# Patient Record
Sex: MALE | Race: WHITE | Employment: OTHER | URBAN - NONMETROPOLITAN AREA
[De-identification: names, ages, dates, MRNs, and addresses within clinical notes are randomized per-mention and may not be internally consistent; named-entity substitution may affect disease eponyms.]

---

## 2017-01-27 LAB — PROSTATE SPECIFIC ANTIGEN: 8.65 NG/ML

## 2017-04-10 ENCOUNTER — OFFICE VISIT (OUTPATIENT)
Dept: UROLOGY | Age: 82
End: 2017-04-10

## 2017-04-10 VITALS
DIASTOLIC BLOOD PRESSURE: 88 MMHG | BODY MASS INDEX: 39.83 KG/M2 | SYSTOLIC BLOOD PRESSURE: 150 MMHG | HEIGHT: 68 IN | WEIGHT: 262.8 LBS

## 2017-04-10 DIAGNOSIS — R97.20 ELEVATED PSA: Primary | ICD-10-CM

## 2017-04-10 DIAGNOSIS — R35.1 NOCTURIA: ICD-10-CM

## 2017-04-10 LAB
BILIRUBIN, POC: NORMAL
BLOOD URINE, POC: NORMAL
CLARITY, POC: CLEAR
COLOR, POC: YELLOW
GLUCOSE URINE, POC: NORMAL
KETONES, POC: NORMAL
LEUKOCYTE EST, POC: NORMAL
NITRITE, POC: NORMAL
PH, POC: 6
PROTEIN, POC: NORMAL
SPECIFIC GRAVITY, POC: 1.02
UROBILINOGEN, POC: NORMAL

## 2017-04-10 PROCEDURE — 4040F PNEUMOC VAC/ADMIN/RCVD: CPT | Performed by: UROLOGY

## 2017-04-10 PROCEDURE — G8427 DOCREV CUR MEDS BY ELIG CLIN: HCPCS | Performed by: UROLOGY

## 2017-04-10 PROCEDURE — 1123F ACP DISCUSS/DSCN MKR DOCD: CPT | Performed by: UROLOGY

## 2017-04-10 PROCEDURE — 81003 URINALYSIS AUTO W/O SCOPE: CPT | Performed by: UROLOGY

## 2017-04-10 PROCEDURE — 1036F TOBACCO NON-USER: CPT | Performed by: UROLOGY

## 2017-04-10 PROCEDURE — G8417 CALC BMI ABV UP PARAM F/U: HCPCS | Performed by: UROLOGY

## 2017-04-10 PROCEDURE — G8598 ASA/ANTIPLAT THER USED: HCPCS | Performed by: UROLOGY

## 2017-04-10 PROCEDURE — 99203 OFFICE O/P NEW LOW 30 MIN: CPT | Performed by: UROLOGY

## 2017-04-10 ASSESSMENT — ENCOUNTER SYMPTOMS
EYE PAIN: 0
COLOR CHANGE: 0
BACK PAIN: 0
FACIAL SWELLING: 0
CHEST TIGHTNESS: 0
NAUSEA: 0
ABDOMINAL PAIN: 0
EYE REDNESS: 0
SHORTNESS OF BREATH: 0

## 2017-09-12 ENCOUNTER — TELEPHONE (OUTPATIENT)
Dept: UROLOGY | Age: 82
End: 2017-09-12

## 2017-12-04 LAB
PROSTATE SPECIFIC ANTIGEN FREE: 0.8 NG/ML
PROSTATE SPECIFIC ANTIGEN PERCENT FREE: 9 %
PSA-PROSTATE SPECIFIC AG: 8.6

## 2017-12-11 ENCOUNTER — OFFICE VISIT (OUTPATIENT)
Dept: UROLOGY | Age: 82
End: 2017-12-11
Payer: MEDICARE

## 2017-12-11 VITALS
BODY MASS INDEX: 40.74 KG/M2 | HEIGHT: 68 IN | DIASTOLIC BLOOD PRESSURE: 70 MMHG | WEIGHT: 268.8 LBS | SYSTOLIC BLOOD PRESSURE: 110 MMHG

## 2017-12-11 DIAGNOSIS — R97.20 ELEVATED PSA: Primary | ICD-10-CM

## 2017-12-11 DIAGNOSIS — R35.1 NOCTURIA: ICD-10-CM

## 2017-12-11 LAB
BILIRUBIN, POC: NORMAL
BLOOD URINE, POC: NORMAL
CLARITY, POC: CLEAR
COLOR, POC: YELLOW
GLUCOSE URINE, POC: NORMAL
KETONES, POC: NORMAL
LEUKOCYTE EST, POC: NORMAL
NITRITE, POC: NORMAL
PH, POC: 7
POST VOID RESIDUAL (PVR): 6 ML
PROTEIN, POC: NORMAL
SPECIFIC GRAVITY, POC: 1.02
UROBILINOGEN, POC: NORMAL

## 2017-12-11 PROCEDURE — 51798 US URINE CAPACITY MEASURE: CPT | Performed by: UROLOGY

## 2017-12-11 PROCEDURE — G8427 DOCREV CUR MEDS BY ELIG CLIN: HCPCS | Performed by: UROLOGY

## 2017-12-11 PROCEDURE — G8417 CALC BMI ABV UP PARAM F/U: HCPCS | Performed by: UROLOGY

## 2017-12-11 PROCEDURE — 1036F TOBACCO NON-USER: CPT | Performed by: UROLOGY

## 2017-12-11 PROCEDURE — G8484 FLU IMMUNIZE NO ADMIN: HCPCS | Performed by: UROLOGY

## 2017-12-11 PROCEDURE — 99213 OFFICE O/P EST LOW 20 MIN: CPT | Performed by: UROLOGY

## 2017-12-11 PROCEDURE — 1123F ACP DISCUSS/DSCN MKR DOCD: CPT | Performed by: UROLOGY

## 2017-12-11 PROCEDURE — G8598 ASA/ANTIPLAT THER USED: HCPCS | Performed by: UROLOGY

## 2017-12-11 PROCEDURE — 4040F PNEUMOC VAC/ADMIN/RCVD: CPT | Performed by: UROLOGY

## 2017-12-11 PROCEDURE — 81003 URINALYSIS AUTO W/O SCOPE: CPT | Performed by: UROLOGY

## 2017-12-11 NOTE — PROGRESS NOTES
wishes to follow his PSA. He was explained about chances of missing significant prostate cancer. Return in about 1 year (around 12/11/2018), or if symptoms worsen or fail to improve, for Elevated PSA. Medication Ordered:  No orders of the defined types were placed in this encounter.     Orders Placed:  Orders Placed This Encounter   Procedures    PSA Prostatic Specific Antigen     Standing Status:   Future     Standing Expiration Date:   12/11/2018    POCT Urinalysis No Micro (Auto)    poct post void residual     Bladder scan       Electronically signed by Sharon Camarena MD on 12/11/2017 at 9:31 AM

## 2018-12-11 DIAGNOSIS — R97.20 ELEVATED PSA: Primary | ICD-10-CM

## 2018-12-11 LAB
INR BLD: 3.8
PROSTATE SPECIFIC ANTIGEN: 12.21 NG/ML
PROTIME: NORMAL SECONDS

## 2018-12-12 ENCOUNTER — TELEPHONE (OUTPATIENT)
Dept: UROLOGY | Age: 83
End: 2018-12-12

## 2019-01-17 ENCOUNTER — OFFICE VISIT (OUTPATIENT)
Dept: UROLOGY | Age: 84
End: 2019-01-17
Payer: MEDICARE

## 2019-01-17 VITALS
BODY MASS INDEX: 39.25 KG/M2 | SYSTOLIC BLOOD PRESSURE: 130 MMHG | HEIGHT: 68 IN | DIASTOLIC BLOOD PRESSURE: 68 MMHG | WEIGHT: 259 LBS

## 2019-01-17 DIAGNOSIS — R97.20 ELEVATED PSA: Primary | ICD-10-CM

## 2019-01-17 LAB
BILIRUBIN, POC: NORMAL
BLOOD URINE, POC: NORMAL
CLARITY, POC: CLEAR
COLOR, POC: YELLOW
GLUCOSE URINE, POC: NORMAL
KETONES, POC: NORMAL
LEUKOCYTE EST, POC: NORMAL
NITRITE, POC: NORMAL
PH, POC: 5.5
PROTEIN, POC: NORMAL
SPECIFIC GRAVITY, POC: 1.02
UROBILINOGEN, POC: 0.2

## 2019-01-17 PROCEDURE — 81003 URINALYSIS AUTO W/O SCOPE: CPT | Performed by: NURSE PRACTITIONER

## 2019-01-17 PROCEDURE — G8417 CALC BMI ABV UP PARAM F/U: HCPCS | Performed by: NURSE PRACTITIONER

## 2019-01-17 PROCEDURE — 4040F PNEUMOC VAC/ADMIN/RCVD: CPT | Performed by: NURSE PRACTITIONER

## 2019-01-17 PROCEDURE — G8427 DOCREV CUR MEDS BY ELIG CLIN: HCPCS | Performed by: NURSE PRACTITIONER

## 2019-01-17 PROCEDURE — G8484 FLU IMMUNIZE NO ADMIN: HCPCS | Performed by: NURSE PRACTITIONER

## 2019-01-17 PROCEDURE — 99214 OFFICE O/P EST MOD 30 MIN: CPT | Performed by: NURSE PRACTITIONER

## 2019-01-17 PROCEDURE — 1123F ACP DISCUSS/DSCN MKR DOCD: CPT | Performed by: NURSE PRACTITIONER

## 2019-01-17 PROCEDURE — 1101F PT FALLS ASSESS-DOCD LE1/YR: CPT | Performed by: NURSE PRACTITIONER

## 2019-01-17 PROCEDURE — 1036F TOBACCO NON-USER: CPT | Performed by: NURSE PRACTITIONER

## 2019-01-28 ENCOUNTER — HOSPITAL ENCOUNTER (OUTPATIENT)
Dept: MRI IMAGING | Age: 84
Discharge: HOME | End: 2019-01-28
Payer: MEDICARE

## 2019-01-28 DIAGNOSIS — R97.20 ELEVATED PSA: ICD-10-CM

## 2019-01-28 LAB — POC CREATININE WHOLE BLOOD: 1 MG/DL (ref 0.5–1.2)

## 2019-01-28 PROCEDURE — A9579 GAD-BASE MR CONTRAST NOS,1ML: HCPCS | Performed by: NURSE PRACTITIONER

## 2019-01-28 PROCEDURE — 76377 3D RENDER W/INTRP POSTPROCES: CPT

## 2019-01-28 PROCEDURE — 6360000004 HC RX CONTRAST MEDICATION: Performed by: NURSE PRACTITIONER

## 2019-01-28 PROCEDURE — 82565 ASSAY OF CREATININE: CPT

## 2019-01-28 RX ADMIN — GADOTERIDOL 20 ML: 279.3 INJECTION, SOLUTION INTRAVENOUS at 14:45

## 2019-01-29 ENCOUNTER — TELEPHONE (OUTPATIENT)
Dept: UROLOGY | Age: 84
End: 2019-01-29

## 2019-02-04 ENCOUNTER — TELEPHONE (OUTPATIENT)
Dept: UROLOGY | Age: 84
End: 2019-02-04

## 2019-02-05 RX ORDER — CIPROFLOXACIN 500 MG/1
500 TABLET, FILM COATED ORAL 2 TIMES DAILY
Qty: 6 TABLET | Refills: 0 | Status: SHIPPED | OUTPATIENT
Start: 2019-02-05 | End: 2019-02-08

## 2019-02-05 RX ORDER — GENTAMICIN SULFATE 40 MG/ML
80 INJECTION, SOLUTION INTRAMUSCULAR; INTRAVENOUS ONCE
Qty: 2 ML | Refills: 0 | Status: SHIPPED | OUTPATIENT
Start: 2019-02-05 | End: 2019-02-05

## 2019-02-18 ENCOUNTER — PROCEDURE VISIT (OUTPATIENT)
Dept: UROLOGY | Age: 84
End: 2019-02-18
Payer: MEDICARE

## 2019-02-18 VITALS
DIASTOLIC BLOOD PRESSURE: 60 MMHG | BODY MASS INDEX: 38.52 KG/M2 | HEIGHT: 69 IN | SYSTOLIC BLOOD PRESSURE: 112 MMHG | WEIGHT: 260.1 LBS

## 2019-02-18 DIAGNOSIS — R97.20 ELEVATED PSA: Primary | ICD-10-CM

## 2019-02-18 PROCEDURE — 99999 PR SONO GUIDE NEEDLE BIOPSY: CPT | Performed by: UROLOGY

## 2019-02-18 PROCEDURE — 96372 THER/PROPH/DIAG INJ SC/IM: CPT | Performed by: UROLOGY

## 2019-02-18 PROCEDURE — 76872 US TRANSRECTAL: CPT | Performed by: UROLOGY

## 2019-02-18 PROCEDURE — 99999 PR OFFICE/OUTPT VISIT,PROCEDURE ONLY: CPT | Performed by: UROLOGY

## 2019-02-18 PROCEDURE — 55700 PR BIOPSY OF PROSTATE,NEEDLE/PUNCH: CPT | Performed by: UROLOGY

## 2019-02-18 RX ORDER — GENTAMICIN SULFATE 40 MG/ML
80 INJECTION, SOLUTION INTRAMUSCULAR; INTRAVENOUS ONCE
Status: COMPLETED | OUTPATIENT
Start: 2019-02-18 | End: 2019-02-18

## 2019-02-18 RX ADMIN — GENTAMICIN SULFATE 80 MG: 40 INJECTION, SOLUTION INTRAMUSCULAR; INTRAVENOUS at 12:54

## 2019-03-05 ENCOUNTER — TELEPHONE (OUTPATIENT)
Dept: UROLOGY | Age: 84
End: 2019-03-05

## 2019-03-06 ENCOUNTER — OFFICE VISIT (OUTPATIENT)
Dept: UROLOGY | Age: 84
End: 2019-03-06
Payer: MEDICARE

## 2019-03-06 VITALS
WEIGHT: 258 LBS | BODY MASS INDEX: 38.21 KG/M2 | DIASTOLIC BLOOD PRESSURE: 60 MMHG | HEIGHT: 69 IN | SYSTOLIC BLOOD PRESSURE: 110 MMHG

## 2019-03-06 DIAGNOSIS — R31.0 GROSS HEMATURIA: ICD-10-CM

## 2019-03-06 DIAGNOSIS — C61 PROSTATE CA (HCC): Primary | ICD-10-CM

## 2019-03-06 LAB
BILIRUBIN URINE: ABNORMAL
BLOOD URINE, POC: ABNORMAL
CHARACTER, URINE: ABNORMAL
COLOR, URINE: ABNORMAL
GLUCOSE URINE: NEGATIVE MG/DL
KETONES, URINE: ABNORMAL
LEUKOCYTE CLUMPS, URINE: NEGATIVE
NITRITE, URINE: NEGATIVE
PH, URINE: 5.5 (ref 5–9)
PROTEIN, URINE: 30 MG/DL
SPECIFIC GRAVITY, URINE: 1.02 (ref 1–1.03)
UROBILINOGEN, URINE: 0.2 EU/DL (ref 0–1)

## 2019-03-06 PROCEDURE — 81003 URINALYSIS AUTO W/O SCOPE: CPT | Performed by: NURSE PRACTITIONER

## 2019-03-06 PROCEDURE — G8427 DOCREV CUR MEDS BY ELIG CLIN: HCPCS | Performed by: NURSE PRACTITIONER

## 2019-03-06 PROCEDURE — 1123F ACP DISCUSS/DSCN MKR DOCD: CPT | Performed by: NURSE PRACTITIONER

## 2019-03-06 PROCEDURE — 1036F TOBACCO NON-USER: CPT | Performed by: NURSE PRACTITIONER

## 2019-03-06 PROCEDURE — 4040F PNEUMOC VAC/ADMIN/RCVD: CPT | Performed by: NURSE PRACTITIONER

## 2019-03-06 PROCEDURE — G8484 FLU IMMUNIZE NO ADMIN: HCPCS | Performed by: NURSE PRACTITIONER

## 2019-03-06 PROCEDURE — G8417 CALC BMI ABV UP PARAM F/U: HCPCS | Performed by: NURSE PRACTITIONER

## 2019-03-06 PROCEDURE — 99215 OFFICE O/P EST HI 40 MIN: CPT | Performed by: NURSE PRACTITIONER

## 2019-03-06 PROCEDURE — 1101F PT FALLS ASSESS-DOCD LE1/YR: CPT | Performed by: NURSE PRACTITIONER

## 2019-03-07 ENCOUNTER — HOSPITAL ENCOUNTER (OUTPATIENT)
Dept: CT IMAGING | Age: 84
Discharge: HOME | End: 2019-03-07
Payer: MEDICARE

## 2019-03-07 DIAGNOSIS — C61 PROSTATE CA (HCC): ICD-10-CM

## 2019-03-07 LAB — POC CREATININE WHOLE BLOOD: 1.1 MG/DL (ref 0.5–1.2)

## 2019-03-07 PROCEDURE — 74178 CT ABD&PLV WO CNTR FLWD CNTR: CPT

## 2019-03-07 PROCEDURE — 82565 ASSAY OF CREATININE: CPT

## 2019-03-07 PROCEDURE — 6360000004 HC RX CONTRAST MEDICATION: Performed by: NURSE PRACTITIONER

## 2019-03-07 RX ADMIN — IOPAMIDOL 85 ML: 755 INJECTION, SOLUTION INTRAVENOUS at 13:31

## 2019-03-08 LAB
ORGANISM: ABNORMAL
ORGANISM: ABNORMAL
URINE CULTURE, ROUTINE: ABNORMAL
URINE CULTURE, ROUTINE: ABNORMAL

## 2019-03-12 ENCOUNTER — HOSPITAL ENCOUNTER (OUTPATIENT)
Dept: NUCLEAR MEDICINE | Age: 84
Discharge: HOME | End: 2019-03-12
Payer: MEDICARE

## 2019-03-12 ENCOUNTER — TELEPHONE (OUTPATIENT)
Dept: UROLOGY | Age: 84
End: 2019-03-12

## 2019-03-12 DIAGNOSIS — C61 PROSTATE CA (HCC): ICD-10-CM

## 2019-03-12 PROCEDURE — A9503 TC99M MEDRONATE: HCPCS | Performed by: NURSE PRACTITIONER

## 2019-03-12 PROCEDURE — 3430000000 HC RX DIAGNOSTIC RADIOPHARMACEUTICAL: Performed by: NURSE PRACTITIONER

## 2019-03-12 PROCEDURE — 78306 BONE IMAGING WHOLE BODY: CPT

## 2019-03-12 RX ORDER — TC 99M MEDRONATE 20 MG/10ML
26.8 INJECTION, POWDER, LYOPHILIZED, FOR SOLUTION INTRAVENOUS
Status: COMPLETED | OUTPATIENT
Start: 2019-03-12 | End: 2019-03-12

## 2019-03-12 RX ORDER — DOXYCYCLINE HYCLATE 100 MG/1
100 CAPSULE ORAL 2 TIMES DAILY
Qty: 14 CAPSULE | Refills: 0 | Status: SHIPPED | OUTPATIENT
Start: 2019-03-12 | End: 2019-03-19

## 2019-03-12 RX ADMIN — TC 99M MEDRONATE 26.8 MILLICURIE: 20 INJECTION, POWDER, LYOPHILIZED, FOR SOLUTION INTRAVENOUS at 10:00

## 2019-03-25 ENCOUNTER — OFFICE VISIT (OUTPATIENT)
Dept: UROLOGY | Age: 84
End: 2019-03-25
Payer: MEDICARE

## 2019-03-25 VITALS
SYSTOLIC BLOOD PRESSURE: 126 MMHG | DIASTOLIC BLOOD PRESSURE: 78 MMHG | WEIGHT: 258 LBS | BODY MASS INDEX: 38.21 KG/M2 | HEIGHT: 69 IN

## 2019-03-25 DIAGNOSIS — C61 PROSTATE CA (HCC): Primary | ICD-10-CM

## 2019-03-25 LAB
BILIRUBIN URINE: NEGATIVE
BLOOD URINE, POC: ABNORMAL
CHARACTER, URINE: CLEAR
COLOR, URINE: YELLOW
GLUCOSE URINE: NEGATIVE MG/DL
KETONES, URINE: NEGATIVE
LEUKOCYTE CLUMPS, URINE: ABNORMAL
NITRITE, URINE: NEGATIVE
PH, URINE: 6 (ref 5–9)
PROTEIN, URINE: NEGATIVE MG/DL
SPECIFIC GRAVITY, URINE: 1.01 (ref 1–1.03)
UROBILINOGEN, URINE: 0.2 EU/DL (ref 0–1)

## 2019-03-25 PROCEDURE — 99214 OFFICE O/P EST MOD 30 MIN: CPT | Performed by: UROLOGY

## 2019-03-25 PROCEDURE — 1036F TOBACCO NON-USER: CPT | Performed by: UROLOGY

## 2019-03-25 PROCEDURE — 81003 URINALYSIS AUTO W/O SCOPE: CPT | Performed by: UROLOGY

## 2019-03-25 PROCEDURE — G8484 FLU IMMUNIZE NO ADMIN: HCPCS | Performed by: UROLOGY

## 2019-03-25 PROCEDURE — 1123F ACP DISCUSS/DSCN MKR DOCD: CPT | Performed by: UROLOGY

## 2019-03-25 PROCEDURE — G8427 DOCREV CUR MEDS BY ELIG CLIN: HCPCS | Performed by: UROLOGY

## 2019-03-25 PROCEDURE — G8417 CALC BMI ABV UP PARAM F/U: HCPCS | Performed by: UROLOGY

## 2019-03-25 PROCEDURE — 4040F PNEUMOC VAC/ADMIN/RCVD: CPT | Performed by: UROLOGY

## 2019-03-25 ASSESSMENT — ENCOUNTER SYMPTOMS
ABDOMINAL PAIN: 0
CONSTIPATION: 0
EYE DISCHARGE: 0
COUGH: 0
BACK PAIN: 0
CHEST TIGHTNESS: 0
EYE PAIN: 0

## 2019-03-25 NOTE — PROGRESS NOTES
Subjective:      Patient ID: Dufm Karol 80 y.o. male 1934    Chief Complaint   Patient presents with    Follow-up     Prostate CA [facility])       Other   This is a new (prostate cancer) problem. The current episode started more than 1 month ago. The problem occurs constantly. The problem has been unchanged. Pertinent negatives include no abdominal pain, chest pain, congestion, coughing or rash. Nothing aggravates the symptoms. He has tried nothing for the symptoms. The treatment provided no relief. Past Medical History:   Diagnosis Date    Atrial fibrillation ([address]     H/O Bell's palsy     Hyperlipidemia     Hypertension        Social History     Socioeconomic History    Marital status:      Spouse name: Not on file    Number of children: Not on file    Years of education: Not on file    Highest education level: Not on file   Occupational History    Not on file   Social Needs    Financial resource strain: Not on file    Food insecurity:     Worry: Not on file     Inability: Not on file    Transportation needs:     Medical: Not on file     Non-medical: Not on file   Tobacco Use    Smoking status: Former Smoker     Last attempt to quit: 1994     Years since quittin.2    Smokeless tobacco: Never Used   Substance and Sexual Activity    Alcohol use:  Yes    Drug use: No    Sexual activity: Not on file   Lifestyle    Physical activity:     Days per week: Not on file     Minutes per session: Not on file    Stress: Not on file   Relationships    Social connections:     Talks on phone: Not on file     Gets together: Not on file     Attends Cheondo[employer] service: Not on file     Active member of club or organization: Not on file     Attends meetings of clubs or organizations: Not on file     Relationship status: Not on file    Intimate partner violence:     Fear of current or ex partner: Not on file     Emotionally abused: Not on file     Physically abused: Not on file     Forced sexual activity: Not on file   Other Topics Concern    Not on file   Social History Narrative    Not on file       Family History   Problem Relation Age of Onset    Heart Disease Father        Past Surgical History:   Procedure Laterality Date    COLONOSCOPY  2018    TOTAL HIP ARTHROPLASTY Right 2012       No Known Allergies      Current Outpatient Medications:     Pantoprazole Sodium (PROTONIX PO), Take 40 mg by mouth daily, Disp: , Rfl:     gemfibrozil (LOPID) 600 MG tablet, Take 600 mg by mouth 2 times daily , Disp: , Rfl:     metoprolol succinate (TOPROL XL) 50 MG extended release tablet, Take 50 mg by mouth daily , Disp: , Rfl:     pravastatin (PRAVACHOL) 40 MG tablet, Take 40 mg by mouth daily , Disp: , Rfl:     warfarin (COUMADIN) 3 MG tablet, AS DIRECTED BY DR ALVAREZ, Disp: , Rfl:     aspirin 81 MG tablet, Take 81 mg by mouth daily, Disp: , Rfl:     Omega-3 Fatty Acids (FISH OIL) 1200 MG CAPS, Take 1 capsule by mouth daily, Disp: , Rfl:     Review of Systems   Constitutional: Negative for activity change and appetite change. HENT: Negative for congestion and ear pain. Eyes: Negative for pain and discharge. Respiratory: Negative for cough and chest tightness. Cardiovascular: Negative for chest pain and leg swelling. Gastrointestinal: Negative for abdominal pain and constipation. Endocrine: Negative for cold intolerance and heat intolerance. Genitourinary: Negative for difficulty urinating, frequency, testicular pain and urgency. Musculoskeletal: Negative for back pain and gait problem. Skin: Negative for pallor and rash. Allergic/Immunologic: Negative for environmental allergies and food allergies. Neurological: Negative for dizziness and light-headedness. Hematological: Bruises/bleeds easily. /78   Ht 5' 8.5\" (1.74 m)   Wt 258 lb (117 kg)   BMI 38.66 kg/m²     Objective:   Physical Exam   Constitutional: He is oriented to person, place, and time.  Vital signs are normal. He appears well-developed and well-nourished. He is cooperative. No distress. HENT:   Head: Normocephalic and atraumatic. Mouth/Throat: Oropharynx is clear and moist and mucous membranes are normal. No oropharyngeal exudate. Eyes: Pupils are equal, round, and reactive to light. EOM are normal. Right eye exhibits no discharge. Left eye exhibits no discharge. No scleral icterus. Neck: Trachea normal. No JVD present. No tracheal deviation present. Pulmonary/Chest: Effort normal. No respiratory distress. He has no wheezes. Abdominal: Soft. He exhibits no distension. There is no tenderness. There is no rebound and no CVA tenderness. Musculoskeletal: He exhibits no edema or tenderness. Lymphadenopathy:        Right: No supraclavicular adenopathy present. Left: No supraclavicular adenopathy present. Neurological: He is alert and oriented to person, place, and time. No cranial nerve deficit. Skin: Skin is warm and dry. He is not diaphoretic. Psychiatric: He has a normal mood and affect. His behavior is normal.   Nursing note and vitals reviewed.       Labs    Results for POC orders placed in visit on 03/25/19   POCT Urinalysis No Micro (Auto)   Result Value Ref Range    Glucose, Ur Negative NEGATIVE mg/dl    Bilirubin Urine Negative     Ketones, Urine Negative NEGATIVE    Specific Gravity, Urine 1.015 1.002 - 1.03    Blood, UA POC Moderate (A) NEGATIVE    pH, Urine 6.00 5.0 - 9.0    Protein, Urine Negative NEGATIVE mg/dl    Urobilinogen, Urine 0.20 0.0 - 1.0 eu/dl    Nitrite, Urine Negative NEGATIVE    Leukocyte Clumps, Urine Trace (A) NEGATIVE    Color, Urine Yellow YELLOW-STR    Character, Urine Clear [id number]       No results found for: CREATININE, BUN, NA, K, CL, CO2    Lab Results   Component Value Date    PSA 4.58 05/07/2019    PSA 11.20 04/10/2019    PSA 12.21 12/11/2018     FINAL DIAGNOSIS:  A.  Prostate, right apex, core needle biopsies:   Invasive prostatic adenocarcinoma.   [place] score: 3+4 = 7 (grade group 2).  Tumor volume: 27% (2 of 3 cores, 37 mm). B-C.  Prostate, right mid and right base, core needle biopsies:   No evidence of malignancy. D.  Prostate, left apex, core needle biopsies:   Invasive prostatic adenocarcinoma.   [place] score: 3+4 = 7 (grade group 2).  Tumor volume: 23% (2 of 3 cores, 7 of 31 mm). E.  Prostate, left mid, core needle biopsies:   Invasive prostatic adenocarcinoma.   [place] score: 4+3 = 7 (grade group 3).  Tumor volume: 58% (2 of 2 cores, 19 of 33 mm). Kenia Piedra, left base, core needle biopsies:   Invasive prostatic adenocarcinoma.   [place] score: 4+3 = 7 (grade group 3).  Tumor volume: 64% (2 of 2 cores, 21 of 33 mm). G.  Prostate, lesion #1, core needle biopsies:   Invasive prostatic adenocarcinoma.   [place] score: 4+3 = 7 (grade group 3).  Tumor volume: 94% (4 of 4 cores, 29 of 31 mm). Assessment:       Diagnosis Orders   1. Prostate CA (HCC)  POCT Urinalysis No Micro (Auto)       Mr. Jaden Bonilla presents today in follow-up for Prostate CA ([address] Juanis Soto. At his age I believe he is not a good candidate for surgery. I believe we can likely control his cancer for the remainder of his life with [facility] therapy. We discussed this at length today. I have given him a book on [facility] therapy. He will consider what we discussed today and will call when he is ready to start [facility] therapy. I have reviewed all notes sent along with this referral including notes from a recent visit to his primary care provider. These records demonstrated the following past medical history:  Past Medical History:   Diagnosis Date    Atrial fibrillation ([address]     H/O Bell's palsy     Hyperlipidemia     Hypertension             Plan: Will read about [facility] therapy and likely start soon.

## 2019-04-01 ENCOUNTER — TELEPHONE (OUTPATIENT)
Dept: UROLOGY | Age: 84
End: 2019-04-01

## 2019-04-01 NOTE — TELEPHONE ENCOUNTER
Looks like per Dr. Sherley Smith check out note, patient needs to be started on Firmagon, starting dose, 240 mg. Can we schedule this? Does this need prior authorization?

## 2019-04-01 NOTE — TELEPHONE ENCOUNTER
Patient had recently seen dr Low Menjivar and has decided he wants to proceed with injections.   Daughter kiki calling and she says they know insurance may need to authorize first.

## 2019-04-09 ENCOUNTER — TELEPHONE (OUTPATIENT)
Dept: UROLOGY | Age: 84
End: 2019-04-09

## 2019-04-09 DIAGNOSIS — C61 PROSTATE CA (HCC): Primary | ICD-10-CM

## 2019-04-09 NOTE — TELEPHONE ENCOUNTER
Patient is scheduled in [facility] on 04/11/19 for an OV and starting dose of firmagon. Should he get a psa prior?

## 2019-04-10 LAB — PROSTATE SPECIFIC ANTIGEN: 11.2 NG/ML

## 2019-04-10 NOTE — TELEPHONE ENCOUNTER
I called and spoke with the patient and he stated that he would go to his family Dr fitch to have drawn. I faxed the psa order to Dr. Ruben Johnson at [phone].

## 2019-04-11 ENCOUNTER — NURSE ONLY (OUTPATIENT)
Dept: UROLOGY | Age: 84
End: 2019-04-11
Payer: MEDICARE

## 2019-04-11 VITALS
DIASTOLIC BLOOD PRESSURE: 80 MMHG | SYSTOLIC BLOOD PRESSURE: 132 MMHG | WEIGHT: 258 LBS | HEIGHT: 69 IN | BODY MASS INDEX: 38.21 KG/M2

## 2019-04-11 DIAGNOSIS — C61 PROSTATE CA (HCC): Primary | ICD-10-CM

## 2019-04-11 PROCEDURE — 96402 CHEMO HORMON ANTINEOPL SQ/IM: CPT | Performed by: NURSE PRACTITIONER

## 2019-04-11 PROCEDURE — 99213 OFFICE O/P EST LOW 20 MIN: CPT | Performed by: NURSE PRACTITIONER

## 2019-04-11 NOTE — PROGRESS NOTES
Following Rose Tanner CNP plan of care. FIRMAGON 120 MG GIVEN RIGHT ABDOMEN  MG GIVEN LEFT ABDOMEN. Lot Number: [id number]  Expiration Date: 05/2020  [facility] #: [id number]     After Injection was given there were no reactions at injection site and patient was feeling well. Patient was notified that possible side effects from injections include: Redness, swelling and itching at the injection site. Possible side effects of androgen deprivation therapy, including hot flashes, flushing of the skin, increased weight, decreased sex drive, and difficulties with ED. Patient was instructed to call the office with any further questions or concerns. Psa level of 11.20 done on 04/10/19    Patient supplied their own medications No    Pt [facility] therapy first initiated on 04/11/19.

## 2019-04-11 NOTE — PROGRESS NOTES
[address]  [address] [facility]  Dept: [phone]  Dept Fax: [phone] : [phone]      Visit Date: 2019    HPI:     Chris Joe presents for follow-up of prostate cancer. He underwent MRI [place] guided biopsy on 19 for PSA of 12.21. Pathology showed prostate cancer in every core. Bone scan & CT were negative for metastatic disease. [facility] therapy was suggested for treatment due to patient's age and health status. He presents today for initial Firmagon injection. His daughter presents with him today. Past Medical History:   Diagnosis Date    Atrial fibrillation ([address]     H/O Bell's palsy     Hyperlipidemia     Hypertension       Past Surgical History:   Procedure Laterality Date    COLONOSCOPY  2018    TOTAL HIP ARTHROPLASTY Right 2012       Family History   Problem Relation Age of Onset    Heart Disease Father        Social History     Tobacco Use    Smoking status: Former Smoker     Last attempt to quit: 1994     Years since quittin.2    Smokeless tobacco: Never Used   Substance Use Topics    Alcohol use: Yes          ROS:  Constitutional: Negative for chills, fatigue, fever, or weight loss. Eyes: Denies reported visual changes. ENT: Denies headache, difficulty swallowing, nose bleeds, ringing in ears, or earaches. Cardiovascular: Negative for chest pain, palpitations, tachycardia or edema. Respiratory: Denies cough or SOB. GI:The patient denies abdominal or flank pain, anorexia, nausea or vomiting. : See HPI  Musculoskeletal: Patient denies low back pain or painful or reduced ROM of the back or extremities. Neurological: The patient denies any symptoms of neurological impairment or TIA's; no history of stroke. Lymphatic: Denies swollen glands in neck, axillary or inguinal areas. Psychiatric: Denies anxiety or depression. Skin: Denies rash or lesions.   The remainder of the complete ROS is negative    PHYSICAL EXAM:  VITALS:  /80   Ht 5' 8.5\" (1.74 m)   Wt 258 lb (117 kg)   BMI 38.66 kg/m² . Constitutional:    Alert and oriented times 3, no acute distress and cooperative to examination with appropriate mood and affect. HEENT:   Head:         Normocephalic and atraumatic. Mouth/Throat:          Mucous membranes are normal.   Eyes:         EOM are normal. No scleral icterus. Nose:    The external appearance of the nose is normal  Ears: The ears appear normal to external inspection   Neck:         Supple, symmetrical, trachea midline, no adenopathy, thyroid symmetric, not enlarged and no tenderness. Cardiovascular:        Normal rate, regular rhythm, S1 S2 heart sounds. Pulmonary/Chest:       Chest symmetric with normal A/P diameter, no wheezes, rales, or rhonchi noted. Normal respiratory rate and rhthym. No use of accessory muscles. Abdominal:          Soft. No tenderness. Active bowel sounds. Musculoskeletal:    Normal range of motion. She exhibits no edema or tenderness of lower extremities. Extremities:    No cyanosis, clubbing, or edema present. Neurological:    Alert and oriented. No cranial nerve deficit. There are no focalizing motor or sensory deficits.     DATA:  CBC: No results found for: WBC, RBC, HGB, HCT, MCV, MCH, MCHC, RDW, PLT, MPV  BMP:  No results found for: NA, K, CL, CO2, BUN, CREATININE, CALCIUM, GFRAA, LABGLOM, GLUCOSE  BUN/Creatinine:  No results found for: BUN, CREATININE  Magnesium:  No results found for: MG  Phosphorus:  No results found for: PHOS  PT/INR:    Lab Results   Component Value Date    INR 3.8 12/11/2018     U/A:    Lab Results   Component Value Date    NITRITE neg 01/17/2019    COLORU Yellow 03/25/2019    COLORU yellow 01/17/2019    PHUR 5.5 01/17/2019    CLARITYU clear 01/17/2019    SPECGRAV 1.020 01/17/2019    LEUKOCYTESUR neg 01/17/2019    UROBILINOGEN 0.20 03/25/2019    BILIRUBINUR Negative 03/25/2019    BILIRUBINUR neg 01/17/2019    BLOODU Moderate 03/25/2019    GLUCOSEU Negative 03/25/2019     FINAL DIAGNOSIS:  A.  Prostate, right apex, core needle biopsies:   Invasive prostatic adenocarcinoma.   [place] score: 3+4 = 7 (grade group 2).  Tumor volume: 27% (2 of 3 cores, 37 mm). B-C.  Prostate, right mid and right base, core needle biopsies:   No evidence of malignancy. D.  Prostate, left apex, core needle biopsies:   Invasive prostatic adenocarcinoma.   [place]ong score: 3+4 = 7 (grade group 2).  Tumor volume: 23% (2 of 3 cores, 7 of 31 mm). E.  Prostate, left mid, core needle biopsies:   Invasive prostatic adenocarcinoma.   [place] score: 4+3 = 7 (grade group 3).  Tumor volume: 58% (2 of 2 cores, 19 of 33 mm). Quentin Smart, left base, core needle biopsies:   Invasive prostatic adenocarcinoma.   [place] score: 4+3 = 7 (grade group 3).  Tumor volume: 64% (2 of 2 cores, 21 of 33 mm). G.  Prostate, lesion #1, core needle biopsies:   Invasive prostatic adenocarcinoma.   Chong score: 4+3 = 7 (grade group 3).  Tumor volume: 94% (4 of 4 cores, 29 of 31 mm). Assessment & Plan:         Diagnosis Orders   1. Prostate CA (HCC)  degarelix (FIRMAGON) 120 mg subcutaneous     Firmagon 240 mg initial dose given today. I discussed side effects in detail. All their questions were answered. Follow-up in 1 month for Firmagon with PSA prior. Standing PSA order given. We will plan to switch to Lupron after PSA decreases. We will add Prolia at that time.         Electronically signed by YVETTE Ahn CNP on 4/11/2019 at 11:00 AM

## 2019-05-07 LAB — PROSTATE SPECIFIC ANTIGEN: 4.58 NG/ML

## 2019-05-09 ENCOUNTER — NURSE ONLY (OUTPATIENT)
Dept: UROLOGY | Age: 84
End: 2019-05-09
Payer: MEDICARE

## 2019-05-09 DIAGNOSIS — C61 PROSTATE CANCER (HCC): Primary | ICD-10-CM

## 2019-05-09 PROCEDURE — 96402 CHEMO HORMON ANTINEOPL SQ/IM: CPT | Performed by: NURSE PRACTITIONER

## 2019-05-09 NOTE — PROGRESS NOTES
Following Kristen Guajardo CNP plan of care. FIRMAGON 80 MG GIVEN S.C LLQ ABDOMEN. Lot Number: [id number]  Expiration Date: 06/2020  [facility] #: [id number]    After Injection was given there were no reactions at injection site and patient was feeling well. Patient was notified that possible side effects from injections include: Redness, swelling and itching at the injection site. Possible side effects of androgen deprivation therapy, including hot flashes, flushing of the skin, increased weight, decreased sex drive, and difficulties with ED. Patient was instructed to call the office with any further questions or concerns. Date of last Bone Scan: 03/12/19  TPsa level of 05/07/19 done on 4.58    Patient supplied their own medications No    Pt [address] therapy first initiated on 04/11/19.

## 2019-06-11 LAB — PROSTATE SPECIFIC ANTIGEN: 0.95 NG/ML

## 2019-06-13 ENCOUNTER — OFFICE VISIT (OUTPATIENT)
Dept: UROLOGY | Age: 84
End: 2019-06-13
Payer: MEDICARE

## 2019-06-13 VITALS
HEIGHT: 69 IN | SYSTOLIC BLOOD PRESSURE: 122 MMHG | DIASTOLIC BLOOD PRESSURE: 80 MMHG | WEIGHT: 250 LBS | BODY MASS INDEX: 37.03 KG/M2

## 2019-06-13 DIAGNOSIS — C61 PROSTATE CA (HCC): Primary | ICD-10-CM

## 2019-06-13 DIAGNOSIS — M81.0 OSTEOPOROSIS WITHOUT CURRENT PATHOLOGICAL FRACTURE, UNSPECIFIED OSTEOPOROSIS TYPE: ICD-10-CM

## 2019-06-13 LAB
BILIRUBIN, POC: NORMAL
BLOOD URINE, POC: NORMAL
CLARITY, POC: CLEAR
COLOR, POC: YELLOW
GLUCOSE URINE, POC: NORMAL
KETONES, POC: NORMAL
LEUKOCYTE EST, POC: NORMAL
NITRITE, POC: NORMAL
PH, POC: 6.5
PROTEIN, POC: NORMAL
SPECIFIC GRAVITY, POC: 1.02
UROBILINOGEN, POC: 1

## 2019-06-13 PROCEDURE — G8427 DOCREV CUR MEDS BY ELIG CLIN: HCPCS | Performed by: NURSE PRACTITIONER

## 2019-06-13 PROCEDURE — 4040F PNEUMOC VAC/ADMIN/RCVD: CPT | Performed by: NURSE PRACTITIONER

## 2019-06-13 PROCEDURE — 99213 OFFICE O/P EST LOW 20 MIN: CPT | Performed by: NURSE PRACTITIONER

## 2019-06-13 PROCEDURE — 81003 URINALYSIS AUTO W/O SCOPE: CPT | Performed by: NURSE PRACTITIONER

## 2019-06-13 PROCEDURE — 1036F TOBACCO NON-USER: CPT | Performed by: NURSE PRACTITIONER

## 2019-06-13 PROCEDURE — 96402 CHEMO HORMON ANTINEOPL SQ/IM: CPT | Performed by: NURSE PRACTITIONER

## 2019-06-13 PROCEDURE — 1123F ACP DISCUSS/DSCN MKR DOCD: CPT | Performed by: NURSE PRACTITIONER

## 2019-06-13 PROCEDURE — 96372 THER/PROPH/DIAG INJ SC/IM: CPT | Performed by: NURSE PRACTITIONER

## 2019-06-13 PROCEDURE — G8417 CALC BMI ABV UP PARAM F/U: HCPCS | Performed by: NURSE PRACTITIONER

## 2019-06-13 NOTE — PROGRESS NOTES
Patient has given me verbal consent to perform Lupron Injection yes      Following Андрей Ty [facility] plan of care. LUPRON 45 MG GIVEN I.M left UOQ HIP  Lot Number: [id number]  Expiration Date: 09/24/2021  [facility] #: [id number]    After Injection was given there were no reactions at injection site and patient was feeling well. Patient was notified that possible side effects from injections include: Redness, swelling and itching at the injection site. Possible side effects of androgen deprivation therapy, including hot flashes, flushing of the skin, increased weight, decreased sex drive, and difficulties with ED. Patient was instructed to call the office with any further questions or concerns. Date of last Calcium/Vit D level:Calcium order today   Is patient on Calcium/Vit D replacement? yes  Date of last Dexa Scan: bone scan 03/12/19  Testosterone Level -not needed at this time  Psa level of 0.95 done on 06/11/19    Patient supplied their own medications No      Pt [address] therapy first initiated on 04/11/19. Patient has given me verbal consent to perform Prolia Injection yes      Following Андрей Ty [facility] plan of care. PROLIA 60MG GIVEN left S.C. in patient's arm  Lot Number: [id number]  Expiration Date: 07/21  [facility] #: [id number]    After Injection was given there were no reactions at injection site and patient was feeling well. Patient was notified that possible side effects from injections include: Redness, swelling and itching at the injection site. Possible side effects of androgen deprivation therapy, including hot flashes, flushing of the skin, increased weight, decreased sex drive, and difficulties with ED. Patient was instructed to inform their dental office that they are receiving Prolia and that major dental procedures should be avoided. Patient was advised to call our office with any further questions or concerns.      Any active dental problems, infections, or pain? no    Date of last dental appointment: unknown    Any planned dental procedures? no    Patient supplied their own medications No      Pt [address] therapy first initiated on Prolia started today 06/12/19.

## 2019-06-13 NOTE — PROGRESS NOTES
CLARITYU clear 01/17/2019    SPECGRAV 1.020 01/17/2019    LEUKOCYTESUR neg 01/17/2019    UROBILINOGEN 0.20 03/25/2019    BILIRUBINUR Negative 03/25/2019    BILIRUBINUR neg 01/17/2019    BLOODU Moderate 03/25/2019    GLUCOSEU Negative 03/25/2019     FINAL DIAGNOSIS:  A.  Prostate, right apex, core needle biopsies:   Invasive prostatic adenocarcinoma.   [place] score: 3+4 = 7 (grade group 2).  Tumor volume: 27% (2 of 3 cores, 37 mm). B-C.  Prostate, right mid and right base, core needle biopsies:   No evidence of malignancy. D.  Prostate, left apex, core needle biopsies:   Invasive prostatic adenocarcinoma.   [place] score: 3+4 = 7 (grade group 2).  Tumor volume: 23% (2 of 3 cores, 7 of 31 mm). E.  Prostate, left mid, core needle biopsies:   Invasive prostatic adenocarcinoma.   Chong score: 4+3 = 7 (grade group 3).  Tumor volume: 58% (2 of 2 cores, 19 of 33 mm). Ebb Croak, left base, core needle biopsies:   Invasive prostatic adenocarcinoma.   [place] score: 4+3 = 7 (grade group 3).  Tumor volume: 64% (2 of 2 cores, 21 of 33 mm). G.  Prostate, lesion #1, core needle biopsies:   Invasive prostatic adenocarcinoma.   [place] score: 4+3 = 7 (grade group 3).  Tumor volume: 94% (4 of 4 cores, 29 of 31 mm). Results for POC orders placed in visit on 06/13/19   POCT Urinalysis No Micro (Auto)   Result Value Ref Range    Color, UA yellow     Clarity, UA clear     Glucose, UA POC neg     Bilirubin, UA neg     Ketones, UA neg     Spec Grav, UA 1.020     Blood, UA POC trace-intact     pH, UA 6.5     Protein, UA POC neg     Urobilinogen, UA 1.0     Leukocytes, UA neg     Nitrite, UA neg        Assessment & Plan:      Prostate Cancer    PSA continues to trend down as expected. Lupron 45 mg and Prolia to be given today. Continue calcium & Vitamin D supplements. PSA every 3 months. Check calcium in 3 months also. Follow-up in 6 months with additional PSA prior.         Electronically signed by Ricardo Arriaga

## 2019-09-12 LAB — PROSTATE SPECIFIC ANTIGEN: 0.37 NG/ML

## 2019-09-17 ENCOUNTER — TELEPHONE (OUTPATIENT)
Dept: UROLOGY | Age: 84
End: 2019-09-17

## 2019-09-17 NOTE — TELEPHONE ENCOUNTER
Please let Lawrence Juárezy know that PSA is 0.37, great news. It came down after Lupron injection. F/U as scheduled.

## 2019-12-05 DIAGNOSIS — C61 PROSTATE CA (HCC): Primary | ICD-10-CM

## 2019-12-05 DIAGNOSIS — M81.0 OSTEOPOROSIS WITHOUT CURRENT PATHOLOGICAL FRACTURE, UNSPECIFIED OSTEOPOROSIS TYPE: ICD-10-CM

## 2019-12-05 LAB — PROSTATE SPECIFIC ANTIGEN: 0.29 NG/ML

## 2019-12-10 LAB — CALCIUM SERPL-MCNC: 9.2 MG/DL

## 2019-12-11 ENCOUNTER — OFFICE VISIT (OUTPATIENT)
Dept: UROLOGY | Age: 84
End: 2019-12-11
Payer: MEDICARE

## 2019-12-11 VITALS
WEIGHT: 245.6 LBS | DIASTOLIC BLOOD PRESSURE: 78 MMHG | HEIGHT: 69 IN | SYSTOLIC BLOOD PRESSURE: 122 MMHG | BODY MASS INDEX: 36.38 KG/M2

## 2019-12-11 DIAGNOSIS — Z51.81 THERAPEUTIC DRUG MONITORING: ICD-10-CM

## 2019-12-11 DIAGNOSIS — C61 PROSTATE CANCER (HCC): Primary | ICD-10-CM

## 2019-12-11 DIAGNOSIS — M81.8 IDIOPATHIC OSTEOPOROSIS: ICD-10-CM

## 2019-12-11 DIAGNOSIS — R31.29 MICROSCOPIC HEMATURIA: ICD-10-CM

## 2019-12-11 LAB
BILIRUBIN URINE: NORMAL
BILIRUBIN, POC: NEGATIVE
BLOOD URINE, POC: NORMAL
BLOOD, URINE: NEGATIVE
CLARITY, POC: CLEAR
CLARITY: CLEAR
COLOR, POC: YELLOW
COLOR: YELLOW
GLUCOSE URINE, POC: NEGATIVE
GLUCOSE URINE: NEGATIVE
KETONES, POC: NEGATIVE
KETONES, URINE: NEGATIVE
LEUKOCYTE EST, POC: NEGATIVE
LEUKOCYTE ESTERASE, URINE: NEGATIVE
NITRITE, POC: NEGATIVE
NITRITE, URINE: NEGATIVE
PH UA: 6 (ref 4.5–8)
PH, POC: 6
PROTEIN UA: NEGATIVE
PROTEIN, POC: NEGATIVE
SPECIFIC GRAVITY UA: 1.02 (ref 1–1.03)
SPECIFIC GRAVITY, POC: 1.02
UROBILINOGEN, POC: 0.2
UROBILINOGEN, URINE: NORMAL

## 2019-12-11 PROCEDURE — G8417 CALC BMI ABV UP PARAM F/U: HCPCS | Performed by: NURSE PRACTITIONER

## 2019-12-11 PROCEDURE — 4040F PNEUMOC VAC/ADMIN/RCVD: CPT | Performed by: NURSE PRACTITIONER

## 2019-12-11 PROCEDURE — 99213 OFFICE O/P EST LOW 20 MIN: CPT | Performed by: NURSE PRACTITIONER

## 2019-12-11 PROCEDURE — 1123F ACP DISCUSS/DSCN MKR DOCD: CPT | Performed by: NURSE PRACTITIONER

## 2019-12-11 PROCEDURE — 96401 CHEMO ANTI-NEOPL SQ/IM: CPT | Performed by: NURSE PRACTITIONER

## 2019-12-11 PROCEDURE — G8427 DOCREV CUR MEDS BY ELIG CLIN: HCPCS | Performed by: NURSE PRACTITIONER

## 2019-12-11 PROCEDURE — 81003 URINALYSIS AUTO W/O SCOPE: CPT | Performed by: NURSE PRACTITIONER

## 2019-12-11 PROCEDURE — 1036F TOBACCO NON-USER: CPT | Performed by: NURSE PRACTITIONER

## 2019-12-11 PROCEDURE — G8484 FLU IMMUNIZE NO ADMIN: HCPCS | Performed by: NURSE PRACTITIONER

## 2019-12-11 PROCEDURE — 96402 CHEMO HORMON ANTINEOPL SQ/IM: CPT | Performed by: NURSE PRACTITIONER

## 2019-12-11 RX ORDER — AMOXICILLIN AND CLAVULANATE POTASSIUM 875; 125 MG/1; MG/1
TABLET, FILM COATED ORAL
Refills: 0 | COMMUNITY
Start: 2019-12-09 | End: 2020-06-17

## 2019-12-11 ASSESSMENT — ENCOUNTER SYMPTOMS: BACK PAIN: 0

## 2020-06-15 LAB
BUN BLDV-MCNC: 19 MG/DL
CALCIUM SERPL-MCNC: 9.3 MG/DL
CHLORIDE BLD-SCNC: 108 MMOL/L
CO2: 20 MMOL/L
CREAT SERPL-MCNC: 1.1 MG/DL
GFR CALCULATED: >60
GLUCOSE BLD-MCNC: 99 MG/DL
INR BLD: 2.3
POTASSIUM SERPL-SCNC: 4 MMOL/L
PROSTATE SPECIFIC ANTIGEN: 0.28 NG/ML
PROSTATE SPECIFIC ANTIGEN: 0.28 NG/ML
PROTIME: NORMAL
SODIUM BLD-SCNC: 142 MMOL/L

## 2020-06-16 ASSESSMENT — ENCOUNTER SYMPTOMS: BACK PAIN: 0

## 2020-06-16 NOTE — PROGRESS NOTES
Surgical History  The patient  has a past surgical history that includes Total hip arthroplasty (Right, 2012) and Colonoscopy (2018). Family History  This patient's family history includes Heart Disease in his father. Social History  Lorna Hill  reports that he quit smoking about 26 years ago. He has never used smokeless tobacco. He reports current alcohol use. He reports that he does not use drugs. Subjective:      Review of Systems   Constitutional: Negative for activity change, appetite change, chills, diaphoresis, fatigue, fever and unexpected weight change. Genitourinary: Negative for decreased urine volume, difficulty urinating, dysuria, flank pain, frequency, hematuria and testicular pain. Musculoskeletal: Negative for back pain. Objective:   Temp 97.7 °F (36.5 °C)   Ht 5' 8.5\" (1.74 m)   Wt 238 lb (108 kg)   BMI 35.66 kg/m²     Physical Exam  Constitutional:       General: He is not in acute distress. Appearance: Normal appearance. He is not ill-appearing or diaphoretic. Comments: Accompanied by daughter to today's appt. HENT:      Head: Normocephalic and atraumatic. Eyes:      General: No scleral icterus. Right eye: No discharge. Left eye: No discharge. Cardiovascular:      Rate and Rhythm: Normal rate and regular rhythm. Pulmonary:      Effort: Pulmonary effort is normal. No respiratory distress. Abdominal:      General: There is no distension. Tenderness: There is no abdominal tenderness. Skin:     General: Skin is warm and dry. Neurological:      Mental Status: He is alert and oriented to person, place, and time. Mental status is at baseline. Psychiatric:         Mood and Affect: Mood normal.         Behavior: Behavior normal.         POC  No results found for this visit on 06/17/20.       Patients recent PSA values are as follows  Lab Results   Component Value Date    PSA 0.28 06/15/2020    PSA 0.29 12/05/2019    PSA 0.37 09/12/2019

## 2020-06-17 ENCOUNTER — OFFICE VISIT (OUTPATIENT)
Dept: UROLOGY | Age: 85
End: 2020-06-17
Payer: MEDICARE

## 2020-06-17 VITALS — BODY MASS INDEX: 35.25 KG/M2 | WEIGHT: 238 LBS | TEMPERATURE: 97.7 F | HEIGHT: 69 IN

## 2020-06-17 LAB
BILIRUBIN, POC: NORMAL
BLOOD URINE, POC: NORMAL
CLARITY, POC: CLEAR
COLOR, POC: YELLOW
GLUCOSE URINE, POC: NORMAL
KETONES, POC: NORMAL
LEUKOCYTE EST, POC: NORMAL
NITRITE, POC: NORMAL
PH, POC: 7
PROTEIN, POC: NORMAL
SPECIFIC GRAVITY, POC: 1.02
UROBILINOGEN, POC: 2

## 2020-06-17 PROCEDURE — 4040F PNEUMOC VAC/ADMIN/RCVD: CPT | Performed by: NURSE PRACTITIONER

## 2020-06-17 PROCEDURE — 1036F TOBACCO NON-USER: CPT | Performed by: NURSE PRACTITIONER

## 2020-06-17 PROCEDURE — 99213 OFFICE O/P EST LOW 20 MIN: CPT | Performed by: NURSE PRACTITIONER

## 2020-06-17 PROCEDURE — G8417 CALC BMI ABV UP PARAM F/U: HCPCS | Performed by: NURSE PRACTITIONER

## 2020-06-17 PROCEDURE — G8427 DOCREV CUR MEDS BY ELIG CLIN: HCPCS | Performed by: NURSE PRACTITIONER

## 2020-06-17 PROCEDURE — 1123F ACP DISCUSS/DSCN MKR DOCD: CPT | Performed by: NURSE PRACTITIONER

## 2020-06-17 PROCEDURE — 81003 URINALYSIS AUTO W/O SCOPE: CPT | Performed by: NURSE PRACTITIONER

## 2020-06-17 PROCEDURE — 96402 CHEMO HORMON ANTINEOPL SQ/IM: CPT | Performed by: NURSE PRACTITIONER

## 2020-06-17 PROCEDURE — 96401 CHEMO ANTI-NEOPL SQ/IM: CPT | Performed by: NURSE PRACTITIONER

## 2020-06-17 NOTE — PROGRESS NOTES
dental appointment: unknown, years ago    Any planned dental procedures? no    Patient supplied their own medications No      Pt [address] therapy first initiated on 04/11/19.

## 2020-12-16 ENCOUNTER — OFFICE VISIT (OUTPATIENT)
Dept: UROLOGY | Age: 85
End: 2020-12-16
Payer: MEDICARE

## 2020-12-16 VITALS — TEMPERATURE: 96.5 F | BODY MASS INDEX: 36.68 KG/M2 | WEIGHT: 242 LBS | HEIGHT: 68 IN

## 2020-12-16 LAB
ALBUMIN SERPL-MCNC: 4.3 G/DL
ALP BLD-CCNC: 92 U/L
ALT SERPL-CCNC: 8 U/L
ANION GAP SERPL CALCULATED.3IONS-SCNC: NORMAL MMOL/L
AST SERPL-CCNC: 13 U/L
BILIRUB SERPL-MCNC: 0.7 MG/DL (ref 0.1–1.4)
BILIRUBIN, POC: NORMAL
BLOOD URINE, POC: NORMAL
BUN BLDV-MCNC: 13 MG/DL
CALCIUM SERPL-MCNC: 9.2 MG/DL
CHLORIDE BLD-SCNC: 104 MMOL/L
CLARITY, POC: CLEAR
CO2: 23 MMOL/L
COLOR, POC: YELLOW
CREAT SERPL-MCNC: 1 MG/DL
GFR CALCULATED: NORMAL
GLUCOSE BLD-MCNC: 83 MG/DL
GLUCOSE URINE, POC: NORMAL
HCT VFR BLD CALC: 46.5 % (ref 41–53)
HEMOGLOBIN: 15 G/DL (ref 13.5–17.5)
KETONES, POC: NORMAL
LEUKOCYTE EST, POC: NORMAL
NITRITE, POC: NORMAL
PH, POC: 5.5
POTASSIUM SERPL-SCNC: 4.3 MMOL/L
PROSTATE SPECIFIC ANTIGEN: 0.35 NG/ML
PROTEIN, POC: NORMAL
SODIUM BLD-SCNC: 139 MMOL/L
SPECIFIC GRAVITY, POC: 1.01
TOTAL PROTEIN: 6.9
UROBILINOGEN, POC: 1

## 2020-12-16 PROCEDURE — G8417 CALC BMI ABV UP PARAM F/U: HCPCS | Performed by: NURSE PRACTITIONER

## 2020-12-16 PROCEDURE — 96401 CHEMO ANTI-NEOPL SQ/IM: CPT | Performed by: NURSE PRACTITIONER

## 2020-12-16 PROCEDURE — 1036F TOBACCO NON-USER: CPT | Performed by: NURSE PRACTITIONER

## 2020-12-16 PROCEDURE — 96402 CHEMO HORMON ANTINEOPL SQ/IM: CPT | Performed by: NURSE PRACTITIONER

## 2020-12-16 PROCEDURE — G8427 DOCREV CUR MEDS BY ELIG CLIN: HCPCS | Performed by: NURSE PRACTITIONER

## 2020-12-16 PROCEDURE — 1123F ACP DISCUSS/DSCN MKR DOCD: CPT | Performed by: NURSE PRACTITIONER

## 2020-12-16 PROCEDURE — 4040F PNEUMOC VAC/ADMIN/RCVD: CPT | Performed by: NURSE PRACTITIONER

## 2020-12-16 PROCEDURE — 99213 OFFICE O/P EST LOW 20 MIN: CPT | Performed by: NURSE PRACTITIONER

## 2020-12-16 PROCEDURE — G8484 FLU IMMUNIZE NO ADMIN: HCPCS | Performed by: NURSE PRACTITIONER

## 2020-12-16 PROCEDURE — 81003 URINALYSIS AUTO W/O SCOPE: CPT | Performed by: NURSE PRACTITIONER

## 2020-12-16 ASSESSMENT — ENCOUNTER SYMPTOMS: BACK PAIN: 0

## 2020-12-16 NOTE — PROGRESS NOTES
[address]  [address] [facility] [address]  Dept: [phone]  Loc: [phone]    Visit Date: 12/16/2020        HPI:     Haley Cisneros is a 80 y.o. male who presents today for:  Chief Complaint   Patient presents with    Follow-up    Prostate Cancer       HPI   Pt seen in follow up for prostate cancer. Pt underwent [place] biopsy 2/18/19 for PSA of 12.21 with findings of Chong 4+3=7 and 3+4=7 prostate cancer in 13/15 cores. Bone scan and CT 3/2019 were negative for metastatic disease. Pt was started on hormonal therapy secondary to his age and health status. He started [place] on 4/11/19 and was switched to Lupron 6/13/19. (last dose of Lupron and Prolia 12/11/19). Pt's PSA 0.28 6/15/2020. Pt had blood drawn but lab didn't run his pre-appt labs. Pt denies any complaints. No new back/hip/pelvic pain. No unexpected weight gain or weight loss. No fever, chills, dysuria, gross hematuria. Reports only occasional hot flashes.         Current Outpatient Medications   Medication Sig Dispense Refill    calcium carbonate-vitamin D (CALTRATE) 600-400 MG-UNIT TABS per tab Take 1 tablet by mouth 2 times daily 60 tablet 5    Pantoprazole Sodium (PROTONIX PO) Take 40 mg by mouth daily      gemfibrozil (LOPID) 600 MG tablet Take 600 mg by mouth 2 times daily       metoprolol succinate (TOPROL XL) 50 MG extended release tablet Take 50 mg by mouth daily       pravastatin (PRAVACHOL) 40 MG tablet Take 40 mg by mouth daily       warfarin (COUMADIN) 3 MG tablet AS DIRECTED BY DR ALVAREZ      aspirin 81 MG tablet Take 81 mg by mouth daily      Omega-3 Fatty Acids (FISH OIL) 1200 MG CAPS Take 1 capsule by mouth daily       Current Facility-Administered Medications   Medication Dose Route Frequency Provider Last Rate Last Admin    denosumab (PROLIA) SC injection 60 mg  60 mg Subcutaneous Once [facility], APRN - CNP  leuprolide (LUPRON) injection 45 mg  45 mg Intramuscular Once Maximilian Traylor, APRN - CNP           Past Medical History  Ebony Mention  has a past medical history of Atrial fibrillation ([address] H/O Bell's palsy, Hyperlipidemia, and Hypertension. Past Surgical History  The patient  has a past surgical history that includes Total hip arthroplasty (Right, 2012) and Colonoscopy (2018). Family History  This patient's family history includes Heart Disease in his father. Social History  Ebony Mention  reports that he quit smoking about 26 years ago. He has never used smokeless tobacco. He reports current alcohol use. He reports that he does not use drugs. Subjective:      Review of Systems   Constitutional: Negative for activity change, appetite change, chills, diaphoresis, fatigue, fever and unexpected weight change. Genitourinary: Negative for decreased urine volume, difficulty urinating, dysuria, flank pain, frequency, hematuria and testicular pain. Musculoskeletal: Negative for back pain. Objective:   Temp 96.5 °F (35.8 °C)   Ht 5' 8\" (1.727 m)   Wt 242 lb (109.8 kg)   BMI 36.80 kg/m²     Physical Exam  Constitutional:       General: He is not in acute distress. Appearance: Normal appearance. He is not ill-appearing or diaphoretic. HENT:      Head: Normocephalic and atraumatic. Eyes:      General: No scleral icterus. Right eye: No discharge. Left eye: No discharge. Cardiovascular:      Rate and Rhythm: Normal rate and regular rhythm. Pulmonary:      Effort: Pulmonary effort is normal. No respiratory distress. Abdominal:      General: There is no distension. Tenderness: There is no abdominal tenderness. Skin:     General: Skin is warm and dry. Neurological:      Mental Status: He is alert and oriented to person, place, and time. Mental status is at baseline.    Psychiatric:         Mood and Affect: Mood normal.         Behavior: Behavior normal.

## 2020-12-16 NOTE — PROGRESS NOTES
Patient has given me verbal consent to perform Lupron Injection yes      Following Ancel Soles CNP plan of care. LUPRON 45 MG GIVEN I.M left UOQ HIP  Lot Number: [id number]  Expiration Date: 03/20/2023  [address] #: [id number]    After Injection was given there were no reactions at injection site and patient was feeling well. Patient was notified that possible side effects from injections include: Redness, swelling and itching at the injection site. Possible side effects of androgen deprivation therapy, including hot flashes, flushing of the skin, increased weight, decreased sex drive, and difficulties with ED. Patient was instructed to call the office with any further questions or concerns. Date of last Calcium/Vit D level: 06/15/20  Is patient on Calcium/Vit D replacement? yes  Date of last Bone Scan: 03/12/19  Testosterone Level patient to have drawn today  Psa level done on 06/15/20 of 0.28 patient to have drawn today     Patient supplied their own medications No      Pt [address] therapy first initiated on 04/11/19. Patient has given me verbal consent to perform Prolia Injection yes      Following Ancel Soles CNP plan of care. PROLIA 60MG GIVEN left S.C. in patient's arm  Lot Number: [id number]  Expiration Date: 04/2023  [address] #: [id number]    After Injection was given there were no reactions at injection site and patient was feeling well. Patient was notified that possible side effects from injections include: Redness, swelling and itching at the injection site. Possible side effects of androgen deprivation therapy, including hot flashes, flushing of the skin, increased weight, decreased sex drive, and difficulties with ED. Patient was instructed to inform their dental office that they are receiving Prolia and that major dental procedures should be avoided. Patient was advised to call our office with any further questions or concerns.      Any active dental problems, infections, or pain? no Date of last dental appointment: unknown    Any planned dental procedures? no    Patient supplied their own medications No      Pt [address] therapy first initiated on 04/11/19.

## 2020-12-16 NOTE — PATIENT INSTRUCTIONS
Patient Education        leuprolide  Pronunciation:  SAMMY mast  Brand:  Eligard, Lupron Depot  What is the most important information I should know about leuprolide? Your symptoms may become temporarily worse when you first start using leuprolide. Tell your doctor if this continues for longer than 2 months. Call your doctor at once if you have a seizure, or unusual changes in mood or behavior. Do not use if you are pregnant. What is leuprolide? Leuprolide overstimulates the body's own production of certain hormones, which causes that production to shut down temporarily. Leuprolide reduces the amount of testosterone in men or estrogen in women. Leuprolide is used in men  to treat the symptoms of prostate cancer (but does not treat the cancer itself). Leuprolide is used in women  to treat symptoms of endometriosis (overgrowth of uterine lining outside of the uterus) or uterine fibroids. Leuprolide is also used to treat precocious (early-onset) puberty in both male and female children at least 3years old. Leuprolide may also be used for purposes not listed in this medication guide. What should I discuss with my healthcare provider before using leuprolide? You should not use this medicine if you are allergic to leuprolide or similar medicines such as buserelin, goserelin, histrelin, nafarelin, or if you have abnormal vaginal bleeding that has not been checked by a doctor. Leuprolide can cause birth defects. Do not use if you are pregnant. Tell your doctor right away if you become pregnant. Certain brands or strengths of leuprolide are used to treat only men and should not be used in women or children. Always check your medicine to make sure you have received the correct brand and strength.    Tell your doctor if you have ever had:  · depression, mental illness or psychosis;  · seizures or epilepsy;  · a blood vessel disorder;  · a brain tumor or spinal cord injury; · heart disease, congestive heart failure, long QT syndrome;  · an electrolyte imbalance (such as low levels of potassium or magnesium in your blood); or  · risk factors for bone loss (personal or family history of osteoporosis, smoking, alcohol use, taking steroid or seizure medicines long term). Do not give this medicine to any child without medical advice. Leuprolide usually causes women to stop ovulating or having menstrual periods. However, you may still be able to get pregnant. Use a condom or diaphragm with spermicide to prevent pregnancy. Leuprolide can make hormonal birth control less effective (birth control pills, injections, implants, skin patches, vaginal rings). Call your doctor if your periods continue while you are being treated with this medicine. You should not breast-feed while using this medicine. How should I use leuprolide? Follow all directions on your prescription label and read all medication guides or instruction sheets. Use the medicine exactly as directed. Different brands or strengths of leuprolide are used to treat different conditions. It is very important that you receive exactly the brand and strength your doctor has prescribed. Always check your medication to make sure you have received the correct brand and type prescribed by your doctor. Leuprolide is injected under the skin or into a muscle, once every month or once every 3 to 6 months. A healthcare provider can teach you how to properly use the medication by yourself. Read and carefully follow any Instructions for Use provided with your medicine. Do not use leuprolide if you don't understand all instructions for proper use. Ask your doctor or pharmacist if you have questions. Your symptoms may become temporarily worse as your hormones adjust to leuprolide. A child using this medicine may have increased signs of puberty (such as vaginal bleeding) during the first weeks of treatment. Keep using the medicine as directed, and tell your doctor if your condition is still worse after 2 months of using this medicine. You may need frequent medical tests while using leuprolide. Bone growth may need to be checked in a child treated with Fensolvi. Store Lupron in the original carton at room temperature, away from moisture and heat. Protect from light. Store [employer] or [place] in the refrigerator. Do not freeze. You may take the medicine out and allow it to reach room temperature before mixing and injecting your dose. Mixed medicine must be used within 30 minutes. You may also store Eligard or Fensolvi in its original packaging at room temperature for up to 8 weeks. Use a needle and syringe only once and then place them in a puncture-proof \"sharps\" container. Follow state or local laws about how to dispose of this container. Keep it out of the reach of children and pets. What happens if I miss a dose? Call your doctor for instructions if you miss a dose. What happens if I overdose? Seek emergency medical attention or call the Poison Help line at 1-888.814.5245. What should I avoid while using leuprolide? Follow your doctor's instructions about any restrictions on food, beverages, or activity. What are the possible side effects of leuprolide? Get emergency medical help if you have signs of an allergic reaction (hives, sweating, fast heartbeats, dizziness, difficult breathing, swelling in your face or throat) or a severe skin reaction (fever, sore throat, burning in your eyes, skin pain, red or purple skin rash that spreads and causes blistering and peeling).   Call your doctor at once if you have:  · problems with your pituitary gland --sudden severe headache, vomiting, problems with your eyes or vision, changes in mood or behavior;  · bone pain, loss of movement in any part of your body;  · swelling, rapid weight gain;  · a seizure; · unusual changes in mood or behavior (crying spells, anger, feeling irritable);  · sudden chest pain or discomfort, wheezing, dry cough or hack;  · painful or difficult urination; or  · high blood sugar --increased thirst, increased urination, hunger, dry mouth, fruity breath odor. Rare but serious side effects may occur. Call your doctor if you have:  · pain or unusual sensations in your back, numbness, weakness, or tingly feeling in your legs or feet;  · muscle weakness or loss of use, loss of bowel or bladder control;  · heart attack symptoms --chest pain or pressure, pain spreading to your jaw or shoulder, nausea, sweating; or  · signs of a stroke --sudden numbness or weakness (especially on one side of the body), sudden severe headache, slurred speech. Common side effects may include:  · pituitary gland problems;  · cold symptoms such as stuffy nose, sneezing, sore throat, cough with or without mucus;  · fever, tiredness, not feeling well;  · stomach pain, nausea, vomiting, constipation;  · wheezing, chest tightness, trouble breathing;  · hot flashes, sweating;  · dizziness, mood changes;  · headache, general pain;  · vaginal swelling, itching, or discharge;  · weight changes;  · decreased testicle size;  · decreased interest in sex; or  · redness, pain, swelling, or oozing where the shot was given. This is not a complete list of side effects and others may occur. Call your doctor for medical advice about side effects. You may report side effects to FDA at 3-164-FDA-2180. What other drugs will affect leuprolide? Leuprolide can cause a serious heart problem. Your risk may be higher if you also use certain other medicines for infections, asthma, heart problems, high blood pressure, depression, mental illness, cancer, malaria, or HIV. Other drugs may affect leuprolide, including prescription and over-the-counter medicines, vitamins, and herbal products. Tell your doctor about all your current medicines and any medicine you start or stop using. Where can I get more information? Your pharmacist can provide more information about leuprolide. Remember, keep this and all other medicines out of the reach of children, never share your medicines with others, and use this medication only for the indication prescribed. What is the most important information I should know about Prolia? This medication guide provides information about the Prolia brand of denosumab. Jannifer Riff is another brand of denosumab used to prevent bone fractures and other skeletal conditions in people with tumors that have spread to the bone. Prolia can cause many serious side effects. Call your doctor at once if you have a fever, chills, pain or burning when you urinate, severe stomach pain, cough, shortness of breath, skin problems, numbness or tingling, severe or unusual pain, or skin problems. Do not use if you are pregnant. Use effective birth control while using Prolia, and for at least 5 months after you stop. What is denosumab (Prolia)? Denosumab is a monoclonal antibody. Monoclonal antibodies are made to target and destroy only certain cells in the body. This may help to protect healthy cells from damage. The Prolia brand of denosumab is used to treat osteoporosis or bone loss in men and women who have a high risk of bone fracture. Prolia is sometimes used in people whose bone fracture is caused by certain medicines or cancer treatments. This medication guide provides information about the Prolia brand of denosumab. Jannifer Riff is another brand of denosumab used to prevent bone fractures and other skeletal conditions in people with tumors that have spread to the bone. Denosumab may also be used for purposes not listed in this medication guide. What should I discuss with my healthcare provider before receiving Prolia? You should not receive Prolia if you are allergic to denosumab, or if you have:  · low levels of calcium in your blood (hypocalcemia); or  · if you are pregnant. While you are using Prolia, you should not receive Xgeva, another brand of denosumab.   Tell your doctor if you have ever had:  · kidney disease (or if you are on dialysis);  · a weak immune system (caused by disease or by using certain medicines); · hypoparathyroidism (decreased functioning of the parathyroid glands);  · thyroid surgery;  · any condition that makes it hard for your body to absorb nutrients from food (malabsorption);  · a latex allergy;  · if you are scheduled for a dental procedure; or  · if you cannot take daily calcium and vitamin D. Denosumab may cause bone loss (osteonecrosis) in the jaw. Symptoms include jaw pain or numbness, red or swollen gums, loose teeth, gum infection, or slow healing after dental work. The risk of osteonecrosis is highest in people with cancer, blood cell disorders, pre-existing dental problems, or people treated with steroids, chemotherapy, or radiation. Ask your doctor about your own risk. You may need to have a negative pregnancy test before starting this treatment. Do not use Prolia if you are pregnant. It could harm the unborn baby or cause birth defects. Use effective birth control to prevent pregnancy while you are using this medicine and for at least 5 months after your last dose. Tell your doctor right away if you become pregnant. You should not breastfeed while using denosumab. How is Prolia given? Denosumab is injected under the skin of your stomach, upper thigh, or upper arm. A healthcare provider will give you this injection. Prolia is usually given once every 6 months. Your doctor may have you take extra calcium and vitamin D while you are being treated with denosumab. Take only the amount of calcium and vitamin D that your doctor has prescribed. If you need to have any dental work (especially surgery), tell the dentist ahead of time that you are receiving denosumab. Pay special attention to your dental hygiene. Brush and floss your teeth regularly while receiving this medication. You may need to have a dental exam before you begin treatment with Prolia. Follow your doctor's instructions. Your risk of bone fractures can increase when you stop using Prolia. Do not stop using this medicine without first talking to your doctor. If you keep this medicine at home, store it in the original carton in a refrigerator. Protect from light and do not freeze. Do not shake the prefilled syringe. You may take the carton out of the refrigerator and allow it to reach room temperature before the injection is given. After you have taken Prolia out of the refrigerator, you may keep it at room temperature for up to 14 days. Store in the original container away from heat and light. Each prefilled syringe is for one use only. Throw it away after one use, even if there is still medicine left inside. Use a needle and syringe only once and then place them in a puncture-proof \"sharps\" container. Follow state or local laws about how to dispose of this container. Keep it out of the reach of children and pets. Do not share this medicine with another person, even if they have the same symptoms you have. What happens if I miss a dose? Call your doctor for instructions if you miss a dose or miss an appointment for your Prolia injection. You should receive your missed injection as soon as possible. What happens if I overdose? Seek emergency medical attention or call the Poison Help line at 1-796.252.8556. What should I avoid while receiving Prolia? Follow your doctor's instructions about any restrictions on food, beverages, or activity. What are the possible side effects of Prolia? Get emergency medical help if you have signs of an allergic reaction: hives, itching, rash; difficult breathing, feeling light-headed; swelling of your face, lips, tongue, or throat.   Call your doctor at once if you have:  · new or unusual pain in your thigh, hip, or groin;  · severe pain in your joints, muscles, or bones;  · skin problems such as dryness, peeling, redness, itching, blisters, bumps, oozing, or crusting; or · low calcium level --muscle spasms or contractions, numbness or tingly feeling (around your mouth, or in your fingers and toes). Serious infections may occur during treatment with Prolia. Call your doctor right away if you have signs of infection such as:  · fever, chills, night sweats;  · swelling, pain, tenderness, warmth, or redness anywhere on your body;  · pain or burning when you urinate;  · increased or urgent need to urinate;  · severe stomach pain; or  · cough, wheezing, feeling short of breath. Common side effects may include:  · bladder infection (painful or difficult urination);  · lung infection (cough, shortness of breath);  · headache;  · back pain, muscle pain, joint pain;  · increased blood pressure;  · cold symptoms such as stuffy nose, sneezing, sore throat;  · high cholesterol; or  · pain in your arms or legs. This is not a complete list of side effects and others may occur. Call your doctor for medical advice about side effects. You may report side effects to FDA at 7-403-FDA-4898. What other drugs will affect Prolia? Other drugs may affect Prolia, including prescription and over-the-counter medicines, vitamins, and herbal products. Tell your doctor about all your current medicines and any medicine you start or stop using. Where can I get more information? Your doctor or pharmacist can provide more information about denosumab (Prolia). Remember, keep this and all other medicines out of the reach of children, never share your medicines with others, and use this medication only for the indication prescribed. Every effort has been made to ensure that the information provided by [address] is accurate, up-to-date, and complete, but no guarantee is made to that effect. Drug information contained herein may be time sensitive. [facility] information has been compiled for use by healthcare practitioners and consumers in the [place] and therefore [facility] does not warrant that uses outside of the [place] are appropriate, unless specifically indicated otherwise. [facility]'s drug information does not endorse drugs, diagnose patients or recommend therapy. [facility]'s drug information is an informational resource designed to assist licensed healthcare practitioners in caring for their patients and/or to serve consumers viewing this service as a supplement to, and not a substitute for, the expertise, skill, knowledge and judgment of healthcare practitioners. The absence of a warning for a given drug or drug combination in no way should be construed to indicate that the drug or drug combination is safe, effective or appropriate for any given patient. [facility] does not assume any responsibility for any aspect of healthcare administered with the aid of information [facility] provides. The information contained herein is not intended to cover all possible uses, directions, precautions, warnings, drug interactions, allergic reactions, or adverse effects. If you have questions about the drugs you are taking, check with your doctor, nurse or pharmacist.  Copyright 7684-2892 [address]: 12.01. Revision date: 2/18/2020. Care instructions adapted under license by [facility] ([facility]). If you have questions about a medical condition or this instruction, always ask your healthcare professional. [place] [address] any warranty or liability for your use of this information.

## 2021-06-29 ENCOUNTER — TELEPHONE (OUTPATIENT)
Dept: UROLOGY | Age: 86
End: 2021-06-29

## 2021-06-29 LAB
ALBUMIN SERPL-MCNC: 4.4 G/DL
ALP BLD-CCNC: 77 U/L
ALT SERPL-CCNC: 9 U/L
ANION GAP SERPL CALCULATED.3IONS-SCNC: NORMAL MMOL/L
AST SERPL-CCNC: 17 U/L
BASOPHILS ABSOLUTE: 0.1 /ΜL
BASOPHILS RELATIVE PERCENT: 1.1 %
BILIRUB SERPL-MCNC: 0.7 MG/DL (ref 0.1–1.4)
BUN BLDV-MCNC: 16 MG/DL
CALCIUM SERPL-MCNC: 9.6 MG/DL
CHLORIDE BLD-SCNC: 107 MMOL/L
CO2: 27 MMOL/L
CREAT SERPL-MCNC: 1.1 MG/DL
EOSINOPHILS ABSOLUTE: 0.6 /ΜL
EOSINOPHILS RELATIVE PERCENT: 9.3 %
GFR CALCULATED: NORMAL
GLUCOSE BLD-MCNC: 95 MG/DL
HCT VFR BLD CALC: 50 % (ref 41–53)
HEMOGLOBIN: 16.1 G/DL (ref 13.5–17.5)
LYMPHOCYTES ABSOLUTE: 1.6 /ΜL
LYMPHOCYTES RELATIVE PERCENT: 25 %
MCH RBC QN AUTO: 30.6 PG
MCHC RBC AUTO-ENTMCNC: 32.2 G/DL
MCV RBC AUTO: 94.9 FL
MONOCYTES ABSOLUTE: 0.8 /ΜL
MONOCYTES RELATIVE PERCENT: 12.2 %
NEUTROPHILS ABSOLUTE: 3.2 /ΜL
NEUTROPHILS RELATIVE PERCENT: 52.2 %
PDW BLD-RTO: 14.6 %
PLATELET # BLD: 189 K/ΜL
PMV BLD AUTO: 9.7 FL
POTASSIUM SERPL-SCNC: 4.7 MMOL/L
PROSTATE SPECIFIC ANTIGEN: 0.47 NG/ML
RBC # BLD: 5.27 10^6/ΜL
SODIUM BLD-SCNC: 144 MMOL/L
TOTAL PROTEIN: 7.1
VITAMIN D 25-HYDROXY: 24
VITAMIN D2, 25 HYDROXY: NORMAL
VITAMIN D3,25 HYDROXY: NORMAL
WBC # BLD: 6.2 10^3/ML

## 2021-07-07 ENCOUNTER — OFFICE VISIT (OUTPATIENT)
Dept: UROLOGY | Age: 86
End: 2021-07-07
Payer: MEDICARE

## 2021-07-07 VITALS
SYSTOLIC BLOOD PRESSURE: 136 MMHG | HEIGHT: 69 IN | WEIGHT: 247 LBS | BODY MASS INDEX: 36.58 KG/M2 | DIASTOLIC BLOOD PRESSURE: 80 MMHG

## 2021-07-07 DIAGNOSIS — C61 PROSTATE CANCER (HCC): ICD-10-CM

## 2021-07-07 DIAGNOSIS — R31.29 MICROSCOPIC HEMATURIA: Primary | ICD-10-CM

## 2021-07-07 LAB
BACTERIA: NORMAL
BILIRUBIN URINE: NEGATIVE
BILIRUBIN, POC: NORMAL
BLOOD URINE, POC: NORMAL
BLOOD, URINE: NEGATIVE
CASTS: NORMAL /LPF
CASTS: NORMAL /LPF
CHARACTER, URINE: CLEAR
CLARITY, POC: CLEAR
COLOR, POC: YELLOW
COLOR: YELLOW
CRYSTALS: NORMAL
EPITHELIAL CELLS, UA: NORMAL /HPF
GLUCOSE URINE, POC: NORMAL
GLUCOSE, URINE: NEGATIVE MG/DL
KETONES, POC: NORMAL
KETONES, URINE: NEGATIVE
LEUKOCYTE EST, POC: NORMAL
LEUKOCYTE ESTERASE, URINE: NEGATIVE
MISCELLANEOUS LAB TEST RESULT: NORMAL
NITRITE, POC: NORMAL
NITRITE, URINE: NEGATIVE
PH UA: 6 (ref 5–9)
PH, POC: 6
PROTEIN UA: NEGATIVE MG/DL
PROTEIN, POC: NORMAL
RBC URINE: NORMAL /HPF
RENAL EPITHELIAL, UA: NORMAL
SPECIFIC GRAVITY UA: 1.02 (ref 1–1.03)
SPECIFIC GRAVITY, POC: 1.02
UROBILINOGEN, POC: 1
UROBILINOGEN, URINE: 1 EU/DL (ref 0–1)
WBC UA: NORMAL /HPF
YEAST: NORMAL

## 2021-07-07 PROCEDURE — 1036F TOBACCO NON-USER: CPT | Performed by: NURSE PRACTITIONER

## 2021-07-07 PROCEDURE — G8427 DOCREV CUR MEDS BY ELIG CLIN: HCPCS | Performed by: NURSE PRACTITIONER

## 2021-07-07 PROCEDURE — 99213 OFFICE O/P EST LOW 20 MIN: CPT | Performed by: NURSE PRACTITIONER

## 2021-07-07 PROCEDURE — G8417 CALC BMI ABV UP PARAM F/U: HCPCS | Performed by: NURSE PRACTITIONER

## 2021-07-07 PROCEDURE — 1123F ACP DISCUSS/DSCN MKR DOCD: CPT | Performed by: NURSE PRACTITIONER

## 2021-07-07 PROCEDURE — 4040F PNEUMOC VAC/ADMIN/RCVD: CPT | Performed by: NURSE PRACTITIONER

## 2021-07-07 PROCEDURE — 81003 URINALYSIS AUTO W/O SCOPE: CPT | Performed by: NURSE PRACTITIONER

## 2021-07-07 ASSESSMENT — ENCOUNTER SYMPTOMS
ABDOMINAL PAIN: 0
VOMITING: 0
NAUSEA: 0
BACK PAIN: 0

## 2021-07-07 NOTE — PROGRESS NOTES
[id number] [facility] UROLOGY  [address] [facility],[address] [place] [address]  Dept: [phone]  Loc: [phone]    Visit Date: 7/7/2021        HPI:     Delonte Ramos is a 80 y.o. male who presents today for:  Chief Complaint   Patient presents with    Follow-up     labs prior    Prostate Cancer       HPI   Pt seen in follow up for prostate cancer. Pt underwent Uronav biopsy 2/18/19 for PSA of 12.21 with findings of [place] 4+3=7 and 3+4=7 prostate cancer in 13/15 cores. Bone scan and CT 3/2019 were negative for metastatic disease. Pt was started on hormonal therapy secondary to his age and health status. He started [place] on 4/11/19 and was switched to Lupron 6/13/19. (last dose of Lupron and Prolia 12/16/2020).    Pt denies any complaints. No new back/hip/pelvic pain. No unexpected weight gain or weight loss. No fever, chills, dysuria, gross hematuria. Reports only occasional hot flashes. Current Outpatient Medications   Medication Sig Dispense Refill    calcium carbonate-vitamin D (CALTRATE) 600-400 MG-UNIT TABS per tab Take 1 tablet by mouth 2 times daily 60 tablet 5    Pantoprazole Sodium (PROTONIX PO) Take 40 mg by mouth daily      gemfibrozil (LOPID) 600 MG tablet Take 600 mg by mouth 2 times daily       metoprolol succinate (TOPROL XL) 50 MG extended release tablet Take 50 mg by mouth daily       pravastatin (PRAVACHOL) 40 MG tablet Take 40 mg by mouth daily       warfarin (COUMADIN) 3 MG tablet AS DIRECTED BY DR ALVAREZ      aspirin 81 MG tablet Take 81 mg by mouth daily      Omega-3 Fatty Acids (FISH OIL) 1200 MG CAPS Take 1 capsule by mouth daily       No current facility-administered medications for this visit. Past Medical History  Parker Cabot  has a past medical history of Atrial fibrillation ([address] H/O Bell's palsy, Hyperlipidemia, and Hypertension.     Past Surgical History  The patient  has a past surgical history that includes Total hip arthroplasty (Right, 2012) and Colonoscopy (2018). Family History  This patient's family history includes Heart Disease in his father. Social History  Ebony Mention  reports that he quit smoking about 27 years ago. He has never used smokeless tobacco. He reports current alcohol use. He reports that he does not use drugs. Subjective:      Review of Systems   Constitutional: Negative for activity change, appetite change, chills, diaphoresis, fatigue, fever and unexpected weight change. Gastrointestinal: Negative for abdominal pain, nausea and vomiting. Genitourinary: Negative for decreased urine volume, difficulty urinating, dysuria, flank pain, frequency, hematuria and urgency. Musculoskeletal: Negative for back pain. Objective:   /80   Ht 5' 8.5\" (1.74 m)   Wt 247 lb (112 kg)   BMI 37.01 kg/m²     Physical Exam  Vitals reviewed. Constitutional:       General: He is not in acute distress. Appearance: Normal appearance. He is well-developed. He is not ill-appearing or diaphoretic. HENT:      Head: Normocephalic and atraumatic. Right Ear: External ear normal.      Left Ear: External ear normal.      Nose: Nose normal.      Mouth/Throat:      Mouth: Mucous membranes are moist.   Eyes:      General: No scleral icterus. Right eye: No discharge. Left eye: No discharge. Neck:      Vascular: No JVD. Trachea: No tracheal deviation. Pulmonary:      Effort: Pulmonary effort is normal. No respiratory distress. Abdominal:      General: There is no distension. Tenderness: There is no abdominal tenderness. There is no right CVA tenderness or left CVA tenderness. Musculoskeletal:         General: Normal range of motion. Neurological:      Mental Status: He is alert and oriented to person, place, and time. Mental status is at baseline.    Psychiatric:         Mood and Affect: Mood normal.         Behavior: Behavior normal.         Thought Content: Thought content normal.         POC  No results found for this visit on 07/07/21. Patients recent PSA values are as follows  Lab Results   Component Value Date    PSA 0.47 06/29/2021    PSA 0.35 12/16/2020    PSA 0.28 06/15/2020    PSA 0.28 06/15/2020        Recent BUN/Creatinine:  Lab Results   Component Value Date    BUN 16 06/29/2021    CREATININE 1.1 06/29/2021       Assessment:   Prostate cancer    Plan:     Pt's PSA 0.47. Pt would like to observe PSA at this time and trial intermittent therapy. Check PSA in 2 and 4 months and fu in 4 months. Continue calcium and vitamin d.

## 2021-08-02 ENCOUNTER — TELEPHONE (OUTPATIENT)
Dept: UROLOGY | Age: 86
End: 2021-08-02

## 2021-08-02 NOTE — TELEPHONE ENCOUNTER
Patient is taking calcium with D3 1200 mg/ 1000 units 1 daily. Is that ok? Please advise.  Thank you

## 2021-11-02 ENCOUNTER — NURSE ONLY (OUTPATIENT)
Dept: LAB | Age: 86
End: 2021-11-02

## 2021-11-02 DIAGNOSIS — C61 PROSTATE CANCER (HCC): ICD-10-CM

## 2021-11-02 LAB
INR BLD: 3.32 (ref 0.85–1.13)
PROSTATE SPECIFIC ANTIGEN: 2.75 NG/ML (ref 0–1)

## 2021-11-17 ENCOUNTER — VIRTUAL VISIT (OUTPATIENT)
Dept: UROLOGY | Age: 86
End: 2021-11-17
Payer: MEDICARE

## 2021-11-17 DIAGNOSIS — C61 PROSTATE CANCER (HCC): Primary | ICD-10-CM

## 2021-11-17 PROCEDURE — 99441 PR PHYS/QHP TELEPHONE EVALUATION 5-10 MIN: CPT | Performed by: NURSE PRACTITIONER

## 2021-11-17 NOTE — PROGRESS NOTES
Ny Fraire is a 80 y.o. male evaluated via telephone on 11/17/2021. Consent:  He and/or health care decision maker is aware that that he may receive a bill for this telephone service, depending on his insurance coverage, and has provided verbal consent to proceed: Yes    Documentation:  I communicated with the patient and/or health care decision maker about prostate cancer. Details of this discussion including any medical advice provided:     Pt underwent Uronav biopsy 2/18/19 for PSA of 12.21 with findings of [place] 4+3=7 and 3+4=7 prostate cancer in 13/15 cores.  Bone scan and CT 3/2019 were negative for metastatic disease.  Pt was started on hormonal therapy secondary to his age and health status. Eda Wright started [place] on 4/11/19 and was switched to Lupron 6/13/19.  (last dose of Lupron and Prolia 12/16/2020). PSA 11/2/21 2.75. Pt reports he is doing well. Denies any changes in urination. PSA trending up. Recommend proceeding with Lupron and Prolia injections at this time. Lab/ma for Lupron and Prolia now. PSA/testosterone in 2 months with tele appt to review results. I affirm this is a Patient Initiated Episode with a Patient who has not had a related appointment within my department in the past 7 days or scheduled within the next 24 hours. Patient identification was verified at the start of the visit: Yes    Total Time: minutes: 5-10 minutes    The visit was conducted pursuant to the emergency declaration under the [address] [place] [address], [address] [place] [address] authority and the [employer] and [employer] and [employer] Act. Patient identification was verified, and a caregiver was present when appropriate. The patient was located in a state where the provider was credentialed to provide care.     Note: not billable if this call serves to triage the patient into an appointment for the relevant concern      YVETTE Hatch - CNP

## 2021-11-30 ENCOUNTER — NURSE ONLY (OUTPATIENT)
Dept: UROLOGY | Age: 86
End: 2021-11-30
Payer: MEDICARE

## 2021-11-30 DIAGNOSIS — M81.8 IDIOPATHIC OSTEOPOROSIS: ICD-10-CM

## 2021-11-30 DIAGNOSIS — C61 PROSTATE CANCER (HCC): Primary | ICD-10-CM

## 2021-11-30 PROCEDURE — 96402 CHEMO HORMON ANTINEOPL SQ/IM: CPT | Performed by: NURSE PRACTITIONER

## 2021-11-30 PROCEDURE — 96372 THER/PROPH/DIAG INJ SC/IM: CPT | Performed by: NURSE PRACTITIONER

## 2021-11-30 NOTE — PROGRESS NOTES
Patient has given me verbal consent to perform Lupron Injection  Yes    Following [address] CNP  plan of care. LUPRON 45 MG GIVEN I.M Right UOQ HIP  Lot Number: [id number]  Expiration Date: 3/16/2024  [address] #: [id number]    After Injection was given there were no reactions at injection site and patient was feeling well. Patient was notified that possible side effects from injections include: Redness, swelling and itching at the injection site. Possible side effects of androgen deprivation therapy, including hot flashes, flushing of the skin, increased weight, decreased sex drive, and difficulties with ED. Patient was instructed to call the office with any further questions or concerns. Patient supplied their own medications No    Pt [address] therapy first initiated on 4/11/19. Patient has given me verbal consent to perform Prolia Injection  Yes    Following [address] CNP plan of care. PROLIA 60MG GIVEN Right [place].C. in patient's arm  Lot Number: [id number]  Expiration Date: 10/23  [address] #: [id number]    After Injection was given there were no reactions at injection site and patient was feeling well. Patient was notified that possible side effects from injections include: Redness, swelling and itching at the injection site. Possible side effects of androgen deprivation therapy, including hot flashes, flushing of the skin, increased weight, decreased sex drive, and difficulties with ED. Patient was instructed to inform their dental office that they are receiving Prolia and that major dental procedures should be avoided. Patient was advised to call our office with any further questions or concerns. Any active dental problems, infections, or pain? No    Date of last dental appointment:  No    Any planned dental procedures? No    Patient supplied their own medications No    Pt [address] therapy first initiated on 4/11/19. Subjective:      Patient ID: Rey Rodgers is a 57 y.o. male.    Chief Complaint: Back Pain    Back Pain   This is a new problem. The current episode started yesterday. The problem has been gradually worsening since onset. The pain is present in the lumbar spine. The quality of the pain is described as stabbing. The pain radiates to the left thigh. The symptoms are aggravated by standing. Pertinent negatives include no abdominal pain, bladder incontinence, bowel incontinence, chest pain, dysuria, fever, headaches, numbness, paresthesias, pelvic pain, tingling or weakness. He has tried NSAIDs for the symptoms. The treatment provided no relief.   Patient's wife also mentioned a lesion to the patient's left mid back that has been present over 1 year.  Patient reports when he initially noticed the area, it was because he had accidentally scratched the area causing it to bleed.  The lesion has grown and occasionally bleeds.  He cannot identify any exacerbating or mitigating factors.    Review of Systems   Constitutional: Negative for chills, fatigue and fever.   HENT: Negative.    Eyes: Negative.    Respiratory: Negative for cough, shortness of breath and wheezing.    Cardiovascular: Negative for chest pain, palpitations and leg swelling.   Gastrointestinal: Negative for abdominal pain and bowel incontinence.   Endocrine: Negative.    Genitourinary: Negative.  Negative for bladder incontinence, dysuria and pelvic pain.   Musculoskeletal: Positive for back pain.   Skin: Positive for wound. Negative for rash.   Allergic/Immunologic: Negative.    Neurological: Negative for tingling, weakness, numbness, headaches and paresthesias.   Psychiatric/Behavioral: The patient is not nervous/anxious.        Objective:     /87   Pulse 80   Temp 98.6 °F (37 °C) (Oral)   Ht 6' (1.829 m)   Wt 102.1 kg (225 lb)   BMI 30.52 kg/m²     Physical Exam   Constitutional: He is oriented to person, place, and time. He appears  well-developed and well-nourished.   HENT:   Head: Normocephalic.   Eyes: Pupils are equal, round, and reactive to light.   Cardiovascular: Normal rate, regular rhythm and normal heart sounds.    Pulmonary/Chest: Effort normal and breath sounds normal. No respiratory distress. He has no wheezes. He has no rales.   Musculoskeletal:        Lumbar back: He exhibits tenderness, pain and spasm. He exhibits normal range of motion, no bony tenderness, no swelling, no edema, no deformity, no laceration and normal pulse.        Back:    Neurological: He is alert and oriented to person, place, and time.   Skin: Skin is warm and dry. No rash noted.        Skin lesion see photograph   Psychiatric: He has a normal mood and affect. His behavior is normal. Judgment and thought content normal.   Vitals reviewed.        Assessment:     1. Acute left-sided low back pain with left-sided sciatica    2. Skin lesion of back        Plan:     Acute left-sided low back pain with left-sided sciatica  -     ketorolac injection 30 mg; Inject 30 mg into the muscle one time.  -     methylPREDNISolone acetate injection 80 mg; Inject 1 mL (80 mg total) into the muscle one time.  -     cyclobenzaprine (FLEXERIL) 10 MG tablet; Take 1 tablet (10 mg total) by mouth 3 (three) times daily as needed for Muscle spasms.  Dispense: 30 tablet; Refill: 0  -     naproxen (NAPROSYN) 500 MG tablet; Take 1 tablet (500 mg total) by mouth 2 (two) times daily.  Dispense: 30 tablet; Refill: 0    Skin lesion of back  -     Ambulatory referral to Dermatology    Educational handout provided and discussed with patient  Return if symptoms worsen or fail to improve.

## 2021-12-08 ENCOUNTER — NURSE ONLY (OUTPATIENT)
Dept: LAB | Age: 86
End: 2021-12-08

## 2021-12-08 LAB — INR BLD: 3.53 (ref 0.85–1.13)

## 2021-12-16 ENCOUNTER — NURSE ONLY (OUTPATIENT)
Dept: LAB | Age: 86
End: 2021-12-16

## 2021-12-16 LAB — INR BLD: 2.13 (ref 0.85–1.13)

## 2022-01-12 ENCOUNTER — NURSE ONLY (OUTPATIENT)
Dept: LAB | Age: 87
End: 2022-01-12

## 2022-01-12 DIAGNOSIS — C61 PROSTATE CANCER (HCC): ICD-10-CM

## 2022-01-12 LAB
ALT SERPL-CCNC: 10 U/L (ref 11–66)
INR BLD: 2.31 (ref 0.85–1.13)
PROSTATE SPECIFIC ANTIGEN: 0.79 NG/ML (ref 0–1)

## 2022-01-18 LAB — TESTOSTERONE TOTAL: 18 NG/DL (ref 300–720)

## 2022-01-19 ENCOUNTER — VIRTUAL VISIT (OUTPATIENT)
Dept: UROLOGY | Age: 87
End: 2022-01-19
Payer: MEDICARE

## 2022-01-19 DIAGNOSIS — C61 PROSTATE CANCER (HCC): Primary | ICD-10-CM

## 2022-01-19 DIAGNOSIS — M81.8 OTHER OSTEOPOROSIS WITHOUT CURRENT PATHOLOGICAL FRACTURE: ICD-10-CM

## 2022-01-19 PROCEDURE — 99441 PR PHYS/QHP TELEPHONE EVALUATION 5-10 MIN: CPT | Performed by: NURSE PRACTITIONER

## 2022-01-19 RX ORDER — CHOLECALCIFEROL (VITAMIN D3) 125 MCG
CAPSULE ORAL
COMMUNITY

## 2022-01-19 ASSESSMENT — ENCOUNTER SYMPTOMS
VOMITING: 0
ABDOMINAL PAIN: 0
NAUSEA: 0
BACK PAIN: 0

## 2022-01-19 NOTE — PROGRESS NOTES
Shiva Rosas is a 80 y.o. male evaluated via telephone on 1/19/2022. Consent:  He and/or health care decision maker is aware that that he may receive a bill for this telephone service, depending on his insurance coverage, and has provided verbal consent to proceed: Yes      Documentation:  I communicated with the patient and/or health care decision maker about prostate cancer. Details of this discussion including any medical advice provided:         [id number] [address]  Dept: [phone]  Loc: [phone]    Visit Date: 1/19/2022        HPI:     Shiva Rosas is a 80 y.o. male who presents today for:  Chief Complaint   Patient presents with    Follow-up     psa and testosterone prior     Prostate Cancer       HPI   Pt seen in follow up for prostate cancer. Pt underwent Uronav biopsy 2/18/19 for PSA of 12.21 with findings of [place] 4+3=7 and 3+4=7 prostate cancer in 13/15 cores.  Bone scan and CT 3/2019 were negative for metastatic disease.  Pt was started on hormonal therapy secondary to his age and health status. [facility] started [place] on 4/11/19 and was switched to Lupron 6/13/19.  (last dose of Lupron and Prolia 11/30/21). Pt reports he is doing well. Denies significant side effects. Urinating well.         Current Outpatient Medications   Medication Sig Dispense Refill    Cholecalciferol (VITAMIN D3) 50 MCG (2000 UT) TABS Take by mouth      calcium carbonate-vitamin D (CALTRATE) 600-400 MG-UNIT TABS per tab Take 1 tablet by mouth 2 times daily 60 tablet 5    Pantoprazole Sodium (PROTONIX PO) Take 40 mg by mouth daily      gemfibrozil (LOPID) 600 MG tablet Take 600 mg by mouth 2 times daily       metoprolol succinate (TOPROL XL) 50 MG extended release tablet Take 50 mg by mouth daily       pravastatin (PRAVACHOL) 40 MG tablet Take 40 mg by mouth daily       warfarin (COUMADIN) 3 MG tablet AS DIRECTED BY DR ALVAREZ      aspirin 81 MG tablet Take 81 mg by mouth daily      Omega-3 Fatty Acids (FISH OIL) 1200 MG CAPS Take 1 capsule by mouth daily       No current facility-administered medications for this visit. Past Medical History  Elsa Kelly  has a past medical history of Atrial fibrillation ([address] H/O Bell's palsy, Hyperlipidemia, and Hypertension. Past Surgical History  The patient  has a past surgical history that includes Total hip arthroplasty (Right, 2012) and Colonoscopy (2018). Family History  This patient's family history includes Heart Disease in his father. Social History  Elsa Kelly  reports that he quit smoking about 28 years ago. He has never used smokeless tobacco. He reports current alcohol use. He reports that he does not use drugs. Subjective:      Review of Systems   Constitutional: Negative for activity change, appetite change, chills, diaphoresis, fatigue, fever and unexpected weight change. Gastrointestinal: Negative for abdominal pain, nausea and vomiting. Genitourinary: Negative for decreased urine volume, difficulty urinating, dysuria, flank pain, frequency, hematuria and urgency. Musculoskeletal: Negative for back pain. Objective: There were no vitals taken for this visit. Physical Exam  Unable to perform as visit completed via telephone. POC  No results found for this visit on 01/19/22. Patients recent PSA values are as follows  Lab Results   Component Value Date    PSA 0.79 01/12/2022    PSA 2.75 (H) 11/02/2021    PSA 0.47 06/29/2021        Recent BUN/Creatinine:  Lab Results   Component Value Date    BUN 16 06/29/2021    CREATININE 1.1 06/29/2021       Assessment:   Prostate cancer    Plan:     Pt's PSA trending down to 0.79 from 2.75 prior to last Lupron injection 11/30/21.       I affirm this is a Patient Initiated Episode with a Patient who has not had a related appointment within my department in the past 7 days or scheduled within the next 24 hours. Patient identification was verified at the start of the visit: Yes    Total Time: minutes: 5-10 minutes    The visit was conducted pursuant to the emergency declaration under the [address] [place] [address] and the [employer] and [employer] General Act. Patient identification was verified, and a caregiver was present when appropriate. The patient was located in a state where the provider was credentialed to provide care.     Note: not billable if this call serves to triage the patient into an appointment for the relevant concern      Tamika Villeda, APRN - CNP

## 2022-02-14 ENCOUNTER — NURSE ONLY (OUTPATIENT)
Dept: LAB | Age: 87
End: 2022-02-14

## 2022-02-14 LAB — INR BLD: 2.48 (ref 0.85–1.13)

## 2022-02-21 ENCOUNTER — NURSE ONLY (OUTPATIENT)
Dept: LAB | Age: 87
End: 2022-02-21

## 2022-02-21 LAB
ALT SERPL-CCNC: 9 U/L (ref 11–66)
ANION GAP SERPL CALCULATED.3IONS-SCNC: 15 MEQ/L (ref 8–16)
AST SERPL-CCNC: 16 U/L (ref 5–40)
CHLORIDE BLD-SCNC: 104 MEQ/L (ref 98–111)
CHOLESTEROL, TOTAL: 182 MG/DL (ref 100–199)
CO2: 24 MEQ/L (ref 23–33)
CREAT SERPL-MCNC: 1.1 MG/DL (ref 0.4–1.2)
GFR SERPL CREATININE-BSD FRML MDRD: 63 ML/MIN/1.73M2
GLUCOSE BLD-MCNC: 95 MG/DL (ref 70–108)
HDLC SERPL-MCNC: 44 MG/DL
LDL CHOLESTEROL CALCULATED: 113 MG/DL
POTASSIUM SERPL-SCNC: 4.6 MEQ/L (ref 3.5–5.2)
SODIUM BLD-SCNC: 143 MEQ/L (ref 135–145)
TRIGL SERPL-MCNC: 123 MG/DL (ref 0–199)
VITAMIN D 25-HYDROXY: 26 NG/ML (ref 30–100)

## 2022-05-12 ENCOUNTER — NURSE ONLY (OUTPATIENT)
Dept: LAB | Age: 87
End: 2022-05-12

## 2022-05-12 DIAGNOSIS — C61 PROSTATE CANCER (HCC): ICD-10-CM

## 2022-05-12 DIAGNOSIS — M81.8 OTHER OSTEOPOROSIS WITHOUT CURRENT PATHOLOGICAL FRACTURE: ICD-10-CM

## 2022-05-12 LAB
ALBUMIN SERPL-MCNC: 4.6 G/DL (ref 3.5–5.1)
ALP BLD-CCNC: 88 U/L (ref 38–126)
ALT SERPL-CCNC: 10 U/L (ref 11–66)
ANION GAP SERPL CALCULATED.3IONS-SCNC: 14 MEQ/L (ref 8–16)
AST SERPL-CCNC: 18 U/L (ref 5–40)
BASOPHILS # BLD: 0.8 %
BASOPHILS ABSOLUTE: 0.1 THOU/MM3 (ref 0–0.1)
BILIRUB SERPL-MCNC: 0.8 MG/DL (ref 0.3–1.2)
BUN BLDV-MCNC: 16 MG/DL (ref 7–22)
CALCIUM SERPL-MCNC: 9.2 MG/DL (ref 8.5–10.5)
CHLORIDE BLD-SCNC: 103 MEQ/L (ref 98–111)
CO2: 24 MEQ/L (ref 23–33)
CREAT SERPL-MCNC: 1.1 MG/DL (ref 0.4–1.2)
EOSINOPHIL # BLD: 9.7 %
EOSINOPHILS ABSOLUTE: 0.7 THOU/MM3 (ref 0–0.4)
ERYTHROCYTE [DISTWIDTH] IN BLOOD BY AUTOMATED COUNT: 14.5 % (ref 11.5–14.5)
ERYTHROCYTE [DISTWIDTH] IN BLOOD BY AUTOMATED COUNT: 48.9 FL (ref 35–45)
GFR SERPL CREATININE-BSD FRML MDRD: 63 ML/MIN/1.73M2
GLUCOSE BLD-MCNC: 91 MG/DL (ref 70–108)
HCT VFR BLD CALC: 47.3 % (ref 42–52)
HEMOGLOBIN: 15 GM/DL (ref 14–18)
IMMATURE GRANS (ABS): 0.04 THOU/MM3 (ref 0–0.07)
IMMATURE GRANULOCYTES: 0.5 %
INR BLD: 2.32 (ref 0.85–1.13)
LYMPHOCYTES # BLD: 28.2 %
LYMPHOCYTES ABSOLUTE: 2.1 THOU/MM3 (ref 1–4.8)
MCH RBC QN AUTO: 29.2 PG (ref 26–33)
MCHC RBC AUTO-ENTMCNC: 31.7 GM/DL (ref 32.2–35.5)
MCV RBC AUTO: 92.2 FL (ref 80–94)
MONOCYTES # BLD: 11.3 %
MONOCYTES ABSOLUTE: 0.8 THOU/MM3 (ref 0.4–1.3)
NUCLEATED RED BLOOD CELLS: 0 /100 WBC
PLATELET # BLD: 231 THOU/MM3 (ref 130–400)
PMV BLD AUTO: 9.7 FL (ref 9.4–12.4)
POTASSIUM SERPL-SCNC: 4.1 MEQ/L (ref 3.5–5.2)
PROSTATE SPECIFIC ANTIGEN: 0.46 NG/ML (ref 0–1)
RBC # BLD: 5.13 MILL/MM3 (ref 4.7–6.1)
SEG NEUTROPHILS: 49.5 %
SEGMENTED NEUTROPHILS ABSOLUTE COUNT: 3.7 THOU/MM3 (ref 1.8–7.7)
SODIUM BLD-SCNC: 141 MEQ/L (ref 135–145)
TOTAL PROTEIN: 7.3 G/DL (ref 6.1–8)
VITAMIN D 25-HYDROXY: 29 NG/ML (ref 30–100)
WBC # BLD: 7.5 THOU/MM3 (ref 4.8–10.8)

## 2022-05-16 LAB — TESTOSTERONE TOTAL: 3 NG/DL (ref 300–720)

## 2022-05-18 ENCOUNTER — OFFICE VISIT (OUTPATIENT)
Dept: UROLOGY | Age: 87
End: 2022-05-18
Payer: MEDICARE

## 2022-05-18 VITALS
WEIGHT: 238 LBS | BODY MASS INDEX: 36.07 KG/M2 | SYSTOLIC BLOOD PRESSURE: 142 MMHG | HEIGHT: 68 IN | DIASTOLIC BLOOD PRESSURE: 64 MMHG

## 2022-05-18 DIAGNOSIS — C61 PROSTATE CANCER (HCC): Primary | ICD-10-CM

## 2022-05-18 PROCEDURE — 1123F ACP DISCUSS/DSCN MKR DOCD: CPT | Performed by: NURSE PRACTITIONER

## 2022-05-18 PROCEDURE — G8427 DOCREV CUR MEDS BY ELIG CLIN: HCPCS | Performed by: NURSE PRACTITIONER

## 2022-05-18 PROCEDURE — 99213 OFFICE O/P EST LOW 20 MIN: CPT | Performed by: NURSE PRACTITIONER

## 2022-05-18 PROCEDURE — G8417 CALC BMI ABV UP PARAM F/U: HCPCS | Performed by: NURSE PRACTITIONER

## 2022-05-18 PROCEDURE — 4040F PNEUMOC VAC/ADMIN/RCVD: CPT | Performed by: NURSE PRACTITIONER

## 2022-05-18 PROCEDURE — 81003 URINALYSIS AUTO W/O SCOPE: CPT | Performed by: NURSE PRACTITIONER

## 2022-05-18 PROCEDURE — 1036F TOBACCO NON-USER: CPT | Performed by: NURSE PRACTITIONER

## 2022-05-18 ASSESSMENT — ENCOUNTER SYMPTOMS
VOMITING: 0
ABDOMINAL PAIN: 0
BACK PAIN: 0
NAUSEA: 0

## 2022-05-18 NOTE — PROGRESS NOTES
[address] [place] [address]  [address] [facility] [address] [place] [address]  Dept: [phone]  Loc: [phone]    Visit Date: 5/18/2022        HPI:     Yandy Ceballos is a 80 y.o. male who presents today for:  Chief Complaint   Patient presents with    Follow-up     psa prior    Prostate Cancer       HPI   Pt seen in follow up for prostate cancer.      Pt underwent Uronav biopsy 2/18/19 for PSA of 12.21 with findings of [place] 4+3=7 and 3+4=7 prostate cancer in 13/15 cores.  Bone scan and CT 3/2019 were negative for metastatic disease.  Pt was started on hormonal therapy secondary to his age and health status. Ramos Suero started [place] on 4/11/19 and was switched to Lupron 6/13/19.  (last dose of Lupron and Prolia 11/30/21). He prefers intermittent therapy to continuous. Would like to hold off on injection today as PSA trending down to 0.46. Current Outpatient Medications   Medication Sig Dispense Refill    Cholecalciferol (VITAMIN D3) 50 MCG (2000 UT) TABS Take by mouth      calcium carbonate-vitamin D (CALTRATE) 600-400 MG-UNIT TABS per tab Take 1 tablet by mouth 2 times daily 60 tablet 5    Pantoprazole Sodium (PROTONIX PO) Take 40 mg by mouth daily      gemfibrozil (LOPID) 600 MG tablet Take 600 mg by mouth 2 times daily       metoprolol succinate (TOPROL XL) 50 MG extended release tablet Take 50 mg by mouth daily       pravastatin (PRAVACHOL) 40 MG tablet Take 40 mg by mouth daily       warfarin (COUMADIN) 3 MG tablet AS DIRECTED BY DR ALVAREZ      aspirin 81 MG tablet Take 81 mg by mouth daily      Omega-3 Fatty Acids (FISH OIL) 1200 MG CAPS Take 1 capsule by mouth daily       No current facility-administered medications for this visit. Past Medical History  Deya New  has a past medical history of Atrial fibrillation ([facility] [address] H/O Bell's palsy, Hyperlipidemia, and Hypertension.     Past Surgical History  The patient  has a past surgical history that includes Total hip arthroplasty (Right, 2012) and Colonoscopy (2018). Family History  This patient's family history includes Heart Disease in his father. Social History  Itzel Shultz  reports that he quit smoking about 28 years ago. He has never used smokeless tobacco. He reports current alcohol use. He reports that he does not use drugs. Subjective:      Review of Systems   Constitutional: Negative for activity change, appetite change, chills, diaphoresis, fatigue, fever and unexpected weight change. Gastrointestinal: Negative for abdominal pain, nausea and vomiting. Genitourinary: Negative for decreased urine volume, difficulty urinating, dysuria, flank pain, frequency, hematuria and urgency. Musculoskeletal: Negative for back pain. Objective:   BP (!) 142/64   Ht 5' 8\" (1.727 m)   Wt 238 lb (108 kg)   BMI 36.19 kg/m²     Physical Exam  Vitals reviewed. Constitutional:       General: He is not in acute distress. Appearance: Normal appearance. He is well-developed. He is not ill-appearing or diaphoretic. HENT:      Head: Normocephalic and atraumatic. Right Ear: External ear normal.      Left Ear: External ear normal.      Nose: Nose normal.      Mouth/Throat:      Mouth: Mucous membranes are moist.   Eyes:      General: No scleral icterus. Right eye: No discharge. Left eye: No discharge. Neck:      Vascular: No JVD. Trachea: No tracheal deviation. Cardiovascular:      Rate and Rhythm: Normal rate. Pulmonary:      Effort: Pulmonary effort is normal. No respiratory distress. Breath sounds: Normal breath sounds. Abdominal:      General: There is no distension. Tenderness: There is no abdominal tenderness. There is no right CVA tenderness or left CVA tenderness. Musculoskeletal:         General: Normal range of motion. Skin:     General: Skin is warm and dry.    Neurological:      Mental Status: He is alert and oriented to person, place, and time. Mental status is at baseline. Psychiatric:         Mood and Affect: Mood normal.         Behavior: Behavior normal.         Thought Content: Thought content normal.         POC  No results found for this visit on 05/18/22. Patients recent PSA values are as follows  Lab Results   Component Value Date    PSA 0.46 05/12/2022    PSA 0.79 01/12/2022    PSA 2.75 (H) 11/02/2021        Recent BUN/Creatinine:  Lab Results   Component Value Date    BUN 16 05/12/2022    CREATININE 1.1 05/12/2022       Assessment:   Prostate cancer    Plan:     Pt's PSA trending down to 0.46 5/12/22. Pt prefers intermittent adt to continuous. Repeat PSA in 4 months with appt to review results. F/u in 4 months with PSA a few days prior.

## 2022-08-22 ENCOUNTER — NURSE ONLY (OUTPATIENT)
Dept: LAB | Age: 87
End: 2022-08-22

## 2022-08-22 LAB — INR BLD: 2.19 (ref 0.85–1.13)

## 2022-09-16 ENCOUNTER — NURSE ONLY (OUTPATIENT)
Dept: LAB | Age: 87
End: 2022-09-16

## 2022-09-16 DIAGNOSIS — C61 PROSTATE CANCER (HCC): ICD-10-CM

## 2022-09-16 LAB — PROSTATE SPECIFIC ANTIGEN: 1.72 NG/ML (ref 0–1)

## 2022-09-21 ENCOUNTER — OFFICE VISIT (OUTPATIENT)
Dept: UROLOGY | Age: 87
End: 2022-09-21
Payer: MEDICARE

## 2022-09-21 VITALS
BODY MASS INDEX: 35.31 KG/M2 | DIASTOLIC BLOOD PRESSURE: 72 MMHG | SYSTOLIC BLOOD PRESSURE: 128 MMHG | WEIGHT: 233 LBS | HEIGHT: 68 IN

## 2022-09-21 DIAGNOSIS — C61 PROSTATE CANCER (HCC): Primary | ICD-10-CM

## 2022-09-21 PROCEDURE — G8417 CALC BMI ABV UP PARAM F/U: HCPCS | Performed by: NURSE PRACTITIONER

## 2022-09-21 PROCEDURE — G8427 DOCREV CUR MEDS BY ELIG CLIN: HCPCS | Performed by: NURSE PRACTITIONER

## 2022-09-21 PROCEDURE — 99213 OFFICE O/P EST LOW 20 MIN: CPT | Performed by: NURSE PRACTITIONER

## 2022-09-21 PROCEDURE — 1036F TOBACCO NON-USER: CPT | Performed by: NURSE PRACTITIONER

## 2022-09-21 PROCEDURE — 1123F ACP DISCUSS/DSCN MKR DOCD: CPT | Performed by: NURSE PRACTITIONER

## 2022-09-21 ASSESSMENT — ENCOUNTER SYMPTOMS
NAUSEA: 0
BACK PAIN: 0
VOMITING: 0
ABDOMINAL PAIN: 0

## 2022-09-21 NOTE — PROGRESS NOTES
[address] [place] [address]  [address] [facility] [address] [place] [address]  Dept: [phone]  Loc: [phone]    Visit Date: 9/21/2022        HPI:     Vee Lambert is a 80 y.o. male who presents today for:  Chief Complaint   Patient presents with    Follow-up     PSA PRIOR     Prostate Cancer       HPI  Pt seen in follow up for prostate cancer. Pt underwent Uronav biopsy 2/18/19 for PSA of 12.21 with findings of [place] 4+3=7 and 3+4=7 prostate cancer in 13/15 cores. Bone scan and CT 3/2019 were negative for metastatic disease. Pt was started on hormonal therapy secondary to his age and health status. He started Hilary Alias on 4/11/19 and was switched to Lupron 6/13/19. (last dose of Lupron and Prolia 11/30/21). He prefers intermittent therapy to continuous. PSA trending up to 1.72 but not yet to trigger of 2. Patrick Gomez denies any hematuria, dysuria, increased abdominal or back pain. Current Outpatient Medications   Medication Sig Dispense Refill    Cholecalciferol (VITAMIN D3) 50 MCG (2000 UT) TABS Take by mouth      calcium carbonate-vitamin D (CALTRATE) 600-400 MG-UNIT TABS per tab Take 1 tablet by mouth 2 times daily 60 tablet 5    Pantoprazole Sodium (PROTONIX PO) Take 40 mg by mouth daily      gemfibrozil (LOPID) 600 MG tablet Take 600 mg by mouth 2 times daily       metoprolol succinate (TOPROL XL) 50 MG extended release tablet Take 50 mg by mouth daily       pravastatin (PRAVACHOL) 40 MG tablet Take 40 mg by mouth daily       warfarin (COUMADIN) 3 MG tablet AS DIRECTED BY DR ALVAREZ      aspirin 81 MG tablet Take 81 mg by mouth daily      Omega-3 Fatty Acids (FISH OIL) 1200 MG CAPS Take 1 capsule by mouth daily       No current facility-administered medications for this visit. Past Medical History  Aviva Webster  has a past medical history of Atrial fibrillation ([facility] [address] H/O Bell's palsy, Hyperlipidemia, and Hypertension.     Past Surgical History  The patient  has a past surgical history that includes Total hip arthroplasty (Right, 2012) and Colonoscopy (2018). Family History  This patient's family history includes Heart Disease in his father. Social History  Lester Nava  reports that he quit smoking about 28 years ago. His smoking use included cigarettes. He has never used smokeless tobacco. He reports current alcohol use. He reports that he does not use drugs. Subjective:      Review of Systems   Constitutional:  Negative for activity change, appetite change, chills, diaphoresis, fatigue, fever and unexpected weight change. Gastrointestinal:  Negative for abdominal pain, nausea and vomiting. Genitourinary:  Negative for decreased urine volume, difficulty urinating, dysuria, flank pain, frequency, hematuria and urgency. Musculoskeletal:  Negative for back pain. Objective:   /72   Ht 5' 8\" (1.727 m)   Wt 233 lb (105.7 kg)   BMI 35.43 kg/m²     Physical Exam  Vitals reviewed. Constitutional:       General: He is not in acute distress. Appearance: Normal appearance. He is well-developed. He is not ill-appearing or diaphoretic. HENT:      Head: Normocephalic and atraumatic. Right Ear: External ear normal.      Left Ear: External ear normal.      Nose: Nose normal.      Mouth/Throat:      Mouth: Mucous membranes are moist.   Eyes:      General: No scleral icterus. Right eye: No discharge. Left eye: No discharge. Neck:      Vascular: No JVD. Trachea: No tracheal deviation. Cardiovascular:      Rate and Rhythm: Normal rate and regular rhythm. Pulmonary:      Effort: Pulmonary effort is normal. No respiratory distress. Breath sounds: Normal breath sounds. Abdominal:      General: There is no distension. Tenderness: There is no abdominal tenderness. There is no right CVA tenderness or left CVA tenderness. Musculoskeletal:         General: Normal range of motion.    Skin: General: Skin is warm and dry. Neurological:      Mental Status: He is alert and oriented to person, place, and time. Mental status is at baseline. Psychiatric:         Mood and Affect: Mood normal.         Behavior: Behavior normal.         Thought Content: Thought content normal.     POC  No results found for this visit on 09/21/22. Patients recent PSA values are as follows  Lab Results   Component Value Date    PSA 1.72 (H) 09/16/2022    PSA 0.46 05/12/2022    PSA 0.79 01/12/2022        Recent BUN/Creatinine:  Lab Results   Component Value Date/Time    BUN 16 05/12/2022 08:41 AM    CREATININE 1.1 05/12/2022 08:41 AM       Assessment:   Prostate cancer    Plan:     Pt's PSA trending up to 1.72. Prefers to hold off on ADT until PSA to trigger of 2. Repeat PSA in 3 mos and 6 months. OV in 6 months.

## 2022-11-14 ENCOUNTER — NURSE ONLY (OUTPATIENT)
Dept: LAB | Age: 87
End: 2022-11-14

## 2022-11-14 LAB — INR BLD: 3.73 (ref 0.85–1.13)

## 2022-11-17 ENCOUNTER — NURSE ONLY (OUTPATIENT)
Dept: LAB | Age: 87
End: 2022-11-17

## 2022-11-17 LAB — INR BLD: 2.06 (ref 0.85–1.13)

## 2022-12-27 ENCOUNTER — NURSE ONLY (OUTPATIENT)
Dept: LAB | Age: 87
End: 2022-12-27

## 2022-12-27 DIAGNOSIS — C61 PROSTATE CANCER (HCC): ICD-10-CM

## 2022-12-27 LAB
ALT SERPL-CCNC: 8 U/L (ref 11–66)
ANION GAP SERPL CALCULATED.3IONS-SCNC: 15 MEQ/L (ref 8–16)
AST SERPL-CCNC: 15 U/L (ref 5–40)
CHLORIDE BLD-SCNC: 103 MEQ/L (ref 98–111)
CO2: 25 MEQ/L (ref 23–33)
CREAT SERPL-MCNC: 1 MG/DL (ref 0.4–1.2)
GFR SERPL CREATININE-BSD FRML MDRD: > 60 ML/MIN/1.73M2
POTASSIUM SERPL-SCNC: 4.3 MEQ/L (ref 3.5–5.2)
PROSTATE SPECIFIC ANTIGEN: 3.56 NG/ML (ref 0–1)
SODIUM BLD-SCNC: 143 MEQ/L (ref 135–145)

## 2022-12-28 ENCOUNTER — TELEPHONE (OUTPATIENT)
Dept: UROLOGY | Age: 87
End: 2022-12-28

## 2022-12-28 NOTE — TELEPHONE ENCOUNTER
Pt's psa up to 3.56 as of 12/27/22. Pt has prostate cancer on intermittent ADT therapy with a trigger of 2. Last Lupron injection 11/30/2021. Next appt isn't until March. Recommend moving up OV for Lupron and Prolia injections in January.

## 2023-01-18 ENCOUNTER — OFFICE VISIT (OUTPATIENT)
Dept: UROLOGY | Age: 88
End: 2023-01-18
Payer: MEDICARE

## 2023-01-18 VITALS
BODY MASS INDEX: 35.74 KG/M2 | HEIGHT: 68 IN | WEIGHT: 235.8 LBS | DIASTOLIC BLOOD PRESSURE: 82 MMHG | SYSTOLIC BLOOD PRESSURE: 124 MMHG

## 2023-01-18 DIAGNOSIS — M81.8 IDIOPATHIC OSTEOPOROSIS: ICD-10-CM

## 2023-01-18 DIAGNOSIS — M81.8 OTHER OSTEOPOROSIS WITHOUT CURRENT PATHOLOGICAL FRACTURE: ICD-10-CM

## 2023-01-18 DIAGNOSIS — C61 PROSTATE CANCER (HCC): Primary | ICD-10-CM

## 2023-01-18 PROCEDURE — G8427 DOCREV CUR MEDS BY ELIG CLIN: HCPCS | Performed by: NURSE PRACTITIONER

## 2023-01-18 PROCEDURE — 96402 CHEMO HORMON ANTINEOPL SQ/IM: CPT | Performed by: NURSE PRACTITIONER

## 2023-01-18 PROCEDURE — G8484 FLU IMMUNIZE NO ADMIN: HCPCS | Performed by: NURSE PRACTITIONER

## 2023-01-18 PROCEDURE — 1123F ACP DISCUSS/DSCN MKR DOCD: CPT | Performed by: NURSE PRACTITIONER

## 2023-01-18 PROCEDURE — 99214 OFFICE O/P EST MOD 30 MIN: CPT | Performed by: NURSE PRACTITIONER

## 2023-01-18 PROCEDURE — 1036F TOBACCO NON-USER: CPT | Performed by: NURSE PRACTITIONER

## 2023-01-18 PROCEDURE — 96372 THER/PROPH/DIAG INJ SC/IM: CPT | Performed by: NURSE PRACTITIONER

## 2023-01-18 PROCEDURE — G8417 CALC BMI ABV UP PARAM F/U: HCPCS | Performed by: NURSE PRACTITIONER

## 2023-01-18 RX ORDER — AMLODIPINE BESYLATE 5 MG/1
TABLET ORAL
COMMUNITY
Start: 2023-01-05

## 2023-01-18 ASSESSMENT — ENCOUNTER SYMPTOMS
BACK PAIN: 0
NAUSEA: 0
VOMITING: 0
ABDOMINAL PAIN: 0

## 2023-01-18 NOTE — PROGRESS NOTES
[address] [place] [address]  [address] [facility] [address] [place] [address]  Dept: [phone]  Loc: [phone]    Visit Date: 1/18/2023        HPI:     Azeem Ramirez is a 80 y.o. male who presents today for:  Chief Complaint   Patient presents with    Prostate Cancer     Lupron and Prolia       HPI  Pt seen in follow up for prostate cancer. Pt underwent Uronav biopsy 2/18/19 for PSA of 12.21 with findings of [place] 4+3=7 and 3+4=7 prostate cancer in 13/15 cores. Bone scan and CT 3/2019 were negative for metastatic disease. Pt was started on hormonal therapy secondary to his age and health status. He started [place] on 4/11/19 and was switched to Lupron 6/13/19. (last dose of Lupron and Prolia 11/30/21). He prefers intermittent therapy to continuous. Pt takes Lupron and Prolia for trigger of 2. Pao Sun denies any hematuria, dysuria, increased abdominal or back pain. Denies any dental sores or open areas. No recent dental work. Current Outpatient Medications   Medication Sig Dispense Refill    amLODIPine (NORVASC) 5 MG tablet TAKE 1 TABLET BY MOUTH ONCE DAILY FOR 90 DAYS      Cholecalciferol (VITAMIN D3) 50 MCG (2000 UT) TABS Take by mouth      calcium carbonate-vitamin D (CALTRATE) 600-400 MG-UNIT TABS per tab Take 1 tablet by mouth 2 times daily 60 tablet 5    Pantoprazole Sodium (PROTONIX PO) Take 40 mg by mouth daily      gemfibrozil (LOPID) 600 MG tablet Take 600 mg by mouth 2 times daily       metoprolol succinate (TOPROL XL) 50 MG extended release tablet Take 50 mg by mouth daily       pravastatin (PRAVACHOL) 40 MG tablet Take 40 mg by mouth daily       warfarin (COUMADIN) 3 MG tablet AS DIRECTED BY DR ALVAREZ      aspirin 81 MG tablet Take 81 mg by mouth daily      Omega-3 Fatty Acids (FISH OIL) 1200 MG CAPS Take 1 capsule by mouth daily       No current facility-administered medications for this visit.        Past Medical History  Trish Lat has a past medical history of Atrial fibrillation ([address] H/O Bell's palsy, Hyperlipidemia, and Hypertension. Past Surgical History  The patient  has a past surgical history that includes Total hip arthroplasty (Right, 2012) and Colonoscopy (2018). Family History  This patient's family history includes Heart Disease in his father. Social History  Latanya Ingram  reports that he quit smoking about 29 years ago. His smoking use included cigarettes. He has never used smokeless tobacco. He reports current alcohol use. He reports that he does not use drugs. Subjective:      Review of Systems   Constitutional:  Negative for activity change, appetite change, chills, diaphoresis, fatigue, fever and unexpected weight change. Gastrointestinal:  Negative for abdominal pain, nausea and vomiting. Genitourinary:  Negative for decreased urine volume, difficulty urinating, dysuria, flank pain, frequency, hematuria and urgency. Musculoskeletal:  Negative for back pain. Objective:   /82   Ht 5' 8\" (1.727 m)   Wt 235 lb 12.8 oz (107 kg)   BMI 35.85 kg/m²     Physical Exam  Vitals reviewed. Constitutional:       General: He is not in acute distress. Appearance: Normal appearance. He is well-developed. He is not ill-appearing or diaphoretic. HENT:      Head: Normocephalic and atraumatic. Right Ear: External ear normal.      Left Ear: External ear normal.      Nose: Nose normal.      Mouth/Throat:      Mouth: Mucous membranes are moist.   Eyes:      General: No scleral icterus. Right eye: No discharge. Left eye: No discharge. Neck:      Vascular: No JVD. Trachea: No tracheal deviation. Cardiovascular:      Rate and Rhythm: Normal rate and regular rhythm. Pulmonary:      Effort: Pulmonary effort is normal. No respiratory distress. Abdominal:      General: There is no distension. Tenderness: There is no abdominal tenderness.  There is no right CVA tenderness or left CVA tenderness. Musculoskeletal:         General: Normal range of motion. Skin:     General: Skin is warm and dry. Neurological:      Mental Status: He is alert and oriented to person, place, and time. Mental status is at baseline. Psychiatric:         Mood and Affect: Mood normal.         Behavior: Behavior normal.         Thought Content: Thought content normal.       POC  No results found for this visit on 01/18/23. Patients recent PSA values are as follows  Lab Results   Component Value Date    PSA 3.56 (H) 12/27/2022    PSA 1.72 (H) 09/16/2022    PSA 0.46 05/12/2022        Recent BUN/Creatinine:  Lab Results   Component Value Date/Time    BUN 16 05/12/2022 08:41 AM    CREATININE 1.0 12/27/2022 08:04 AM         Assessment:   Prostate cancer    Plan:     PSA trending up to 3.56. Lupron 45 mg and Prolia 60 mg today. Continue calcium and vitamin D. Repeat PSA in 6 and 12 months.       OV in 1 year

## 2023-01-18 NOTE — PROGRESS NOTES
Patient has given me verbal consent to perform Lupron Injection  Yes      Following Dr. Zack Gordon CNP plan of care. LUPRON 45 MG GIVEN I.M left UOQ HIP  Lot Number: [id number]  Expiration Date: 12/3/24  [facility] #: [id number]    After Injection was given there were no reactions at injection site and patient was feeling well. Patient was notified that possible side effects from injections include: Redness, swelling and itching at the injection site. Possible side effects of androgen deprivation therapy, including hot flashes, flushing of the skin, increased weight, decreased sex drive, and difficulties with ED. Patient was instructed to call the office with any further questions or concerns. Patient supplied their own medications No      Pt LHRH therapy (Francenia Blend, Lupron) first initiated on 4/11/19. Patient has given me verbal consent to perform Prolia Injection  Yes      Following Dr. Zack Gordon [facility] plan of care. PROLIA 60MG GIVEN left S.C. in patient's arm  Lot Number: [id number]  Expiration Date: 3/31/24  [facility] #: [id number]    After Injection was given there were no reactions at injection site and patient was feeling well. Patient was notified that possible side effects from injections include: Redness, swelling and itching at the injection site. Possible side effects of androgen deprivation therapy, including hot flashes, flushing of the skin, increased weight, decreased sex drive, and difficulties with ED. Patient was instructed to inform their dental office that they are receiving Prolia and that major dental procedures should be avoided. Patient was advised to call our office with any further questions or concerns. Any active dental problems, infections, or pain? No    Date of last dental appointment:  N/A    Any planned dental procedures? No    Patient supplied their own medications No      Pt LHRH therapy (Francenia Blend, Lupron) first initiated on 4/11/19.

## 2023-03-08 ENCOUNTER — HOSPITAL ENCOUNTER (INPATIENT)
Age: 88
LOS: 1 days | Discharge: HOME | DRG: 177 | End: 2023-03-09
Payer: MEDICARE

## 2023-03-08 ENCOUNTER — APPOINTMENT (OUTPATIENT)
Dept: GENERAL RADIOLOGY | Age: 88
DRG: 177 | End: 2023-03-08
Payer: MEDICARE

## 2023-03-08 DIAGNOSIS — J18.9 PNEUMONIA OF BOTH LOWER LOBES DUE TO INFECTIOUS ORGANISM: ICD-10-CM

## 2023-03-08 DIAGNOSIS — U07.1 COVID-19: Primary | ICD-10-CM

## 2023-03-08 DIAGNOSIS — R06.89 DYSPNEA AND RESPIRATORY ABNORMALITIES: ICD-10-CM

## 2023-03-08 DIAGNOSIS — R06.00 DYSPNEA AND RESPIRATORY ABNORMALITIES: ICD-10-CM

## 2023-03-08 PROBLEM — J96.01 ACUTE RESPIRATORY FAILURE WITH HYPOXIA (HCC): Status: ACTIVE | Noted: 2023-03-08

## 2023-03-08 LAB
ALBUMIN SERPL BCG-MCNC: 4.1 G/DL (ref 3.5–5.1)
ALP SERPL-CCNC: 80 U/L (ref 38–126)
ALT SERPL W/O P-5'-P-CCNC: 8 U/L (ref 11–66)
ANION GAP SERPL CALC-SCNC: 14 MEQ/L (ref 8–16)
AST SERPL-CCNC: 16 U/L (ref 5–40)
BASOPHILS ABSOLUTE: 0.1 THOU/MM3 (ref 0–0.1)
BASOPHILS NFR BLD AUTO: 0.8 %
BILIRUB SERPL-MCNC: 1.1 MG/DL (ref 0.3–1.2)
BUN SERPL-MCNC: 18 MG/DL (ref 7–22)
CALCIUM SERPL-MCNC: 9 MG/DL (ref 8.5–10.5)
CHLORIDE SERPL-SCNC: 100 MEQ/L (ref 98–111)
CO2 SERPL-SCNC: 21 MEQ/L (ref 23–33)
CREAT SERPL-MCNC: 1.1 MG/DL (ref 0.4–1.2)
D DIMER PPP IA.FEU-MCNC: 697 NG/ML FEU (ref 0–500)
DEPRECATED RDW RBC AUTO: 49.1 FL (ref 35–45)
EKG Q-T INTERVAL: 394 MS
EKG QRS DURATION: 74 MS
EKG QTC CALCULATION (BAZETT): 431 MS
EKG R AXIS: -56 DEGREES
EKG T AXIS: 22 DEGREES
EKG VENTRICULAR RATE: 72 BPM
EOSINOPHIL NFR BLD AUTO: 0.9 %
EOSINOPHILS ABSOLUTE: 0.1 THOU/MM3 (ref 0–0.4)
ERYTHROCYTE [DISTWIDTH] IN BLOOD BY AUTOMATED COUNT: 14.6 % (ref 11.5–14.5)
FLUAV RNA RESP QL NAA+PROBE: NOT DETECTED
FLUBV RNA RESP QL NAA+PROBE: NOT DETECTED
GFR SERPL CREATININE-BSD FRML MDRD: > 60 ML/MIN/1.73M2
GLUCOSE SERPL-MCNC: 88 MG/DL (ref 70–108)
HCT VFR BLD AUTO: 47.6 % (ref 42–52)
HGB BLD-MCNC: 15.2 GM/DL (ref 14–18)
IMM GRANULOCYTES # BLD AUTO: 0.04 THOU/MM3 (ref 0–0.07)
IMM GRANULOCYTES NFR BLD AUTO: 0.5 %
INR PPP: 1.72 (ref 0.85–1.13)
LACTATE SERPL-SCNC: 1.1 MMOL/L (ref 0.5–2)
LYMPHOCYTES ABSOLUTE: 1.1 THOU/MM3 (ref 1–4.8)
LYMPHOCYTES NFR BLD AUTO: 14.3 %
MCH RBC QN AUTO: 29.2 PG (ref 26–33)
MCHC RBC AUTO-ENTMCNC: 31.9 GM/DL (ref 32.2–35.5)
MCV RBC AUTO: 91.4 FL (ref 80–94)
MONOCYTES ABSOLUTE: 1.3 THOU/MM3 (ref 0.4–1.3)
MONOCYTES NFR BLD AUTO: 17 %
NEUTROPHILS NFR BLD AUTO: 66.5 %
NRBC BLD AUTO-RTO: 0 /100 WBC
OSMOLALITY SERPL CALC.SUM OF ELEC: 271.4 MOSMOL/KG (ref 275–300)
PLATELET # BLD AUTO: 195 THOU/MM3 (ref 130–400)
PMV BLD AUTO: 10 FL (ref 9.4–12.4)
POTASSIUM SERPL-SCNC: 4.4 MEQ/L (ref 3.5–5.2)
PROCALCITONIN SERPL IA-MCNC: 0.07 NG/ML (ref 0.01–0.09)
PROT SERPL-MCNC: 7.7 G/DL (ref 6.1–8)
RBC # BLD AUTO: 5.21 MILL/MM3 (ref 4.7–6.1)
SARS-COV-2 RNA RESP QL NAA+PROBE: DETECTED
SEGMENTED NEUTROPHILS ABSOLUTE COUNT: 5 THOU/MM3 (ref 1.8–7.7)
SODIUM SERPL-SCNC: 135 MEQ/L (ref 135–145)
WBC # BLD AUTO: 7.5 THOU/MM3 (ref 4.8–10.8)

## 2023-03-08 PROCEDURE — 83605 ASSAY OF LACTIC ACID: CPT

## 2023-03-08 PROCEDURE — 93005 ELECTROCARDIOGRAM TRACING: CPT

## 2023-03-08 PROCEDURE — 85025 COMPLETE CBC W/AUTO DIFF WBC: CPT

## 2023-03-08 PROCEDURE — 85610 PROTHROMBIN TIME: CPT

## 2023-03-08 PROCEDURE — 87636 SARSCOV2 & INF A&B AMP PRB: CPT

## 2023-03-08 PROCEDURE — 80053 COMPREHEN METABOLIC PANEL: CPT

## 2023-03-08 PROCEDURE — 99285 EMERGENCY DEPT VISIT HI MDM: CPT

## 2023-03-08 PROCEDURE — 6370000000 HC RX 637 (ALT 250 FOR IP)

## 2023-03-08 PROCEDURE — 84145 PROCALCITONIN (PCT): CPT

## 2023-03-08 PROCEDURE — 94669 MECHANICAL CHEST WALL OSCILL: CPT

## 2023-03-08 PROCEDURE — 71045 X-RAY EXAM CHEST 1 VIEW: CPT

## 2023-03-08 PROCEDURE — 85379 FIBRIN DEGRADATION QUANT: CPT

## 2023-03-08 PROCEDURE — 2580000003 HC RX 258

## 2023-03-08 PROCEDURE — 1200000003 HC TELEMETRY R&B

## 2023-03-08 PROCEDURE — 6360000002 HC RX W HCPCS

## 2023-03-08 PROCEDURE — 99223 1ST HOSP IP/OBS HIGH 75: CPT

## 2023-03-08 PROCEDURE — 36415 COLL VENOUS BLD VENIPUNCTURE: CPT

## 2023-03-08 PROCEDURE — 93010 ELECTROCARDIOGRAM REPORT: CPT | Performed by: INTERNAL MEDICINE

## 2023-03-08 RX ORDER — PANTOPRAZOLE SODIUM 40 MG/1
40 TABLET, DELAYED RELEASE ORAL DAILY
Status: DISCONTINUED | OUTPATIENT
Start: 2023-03-08 | End: 2023-03-09 | Stop reason: HOSPADM

## 2023-03-08 RX ORDER — ASPIRIN 81 MG/1
81 TABLET, CHEWABLE ORAL DAILY
Status: DISCONTINUED | OUTPATIENT
Start: 2023-03-08 | End: 2023-03-09 | Stop reason: HOSPADM

## 2023-03-08 RX ORDER — SODIUM CHLORIDE 0.9 % (FLUSH) 0.9 %
5-40 SYRINGE (ML) INJECTION EVERY 12 HOURS SCHEDULED
Status: DISCONTINUED | OUTPATIENT
Start: 2023-03-08 | End: 2023-03-09 | Stop reason: HOSPADM

## 2023-03-08 RX ORDER — TRAZODONE HYDROCHLORIDE 50 MG/1
50 TABLET ORAL NIGHTLY PRN
Status: DISCONTINUED | OUTPATIENT
Start: 2023-03-08 | End: 2023-03-09 | Stop reason: HOSPADM

## 2023-03-08 RX ORDER — GUAIFENESIN/DEXTROMETHORPHAN 100-10MG/5
5 SYRUP ORAL EVERY 4 HOURS PRN
Status: DISCONTINUED | OUTPATIENT
Start: 2023-03-08 | End: 2023-03-09 | Stop reason: HOSPADM

## 2023-03-08 RX ORDER — AMLODIPINE BESYLATE 5 MG/1
5 TABLET ORAL DAILY
Status: DISCONTINUED | OUTPATIENT
Start: 2023-03-08 | End: 2023-03-09 | Stop reason: HOSPADM

## 2023-03-08 RX ORDER — ACETAMINOPHEN 325 MG/1
650 TABLET ORAL EVERY 6 HOURS PRN
Status: DISCONTINUED | OUTPATIENT
Start: 2023-03-08 | End: 2023-03-09 | Stop reason: HOSPADM

## 2023-03-08 RX ORDER — ONDANSETRON 4 MG/1
4 TABLET, ORALLY DISINTEGRATING ORAL EVERY 8 HOURS PRN
Status: DISCONTINUED | OUTPATIENT
Start: 2023-03-08 | End: 2023-03-09 | Stop reason: HOSPADM

## 2023-03-08 RX ORDER — AMOXICILLIN 500 MG
1 CAPSULE ORAL DAILY
Status: DISCONTINUED | OUTPATIENT
Start: 2023-03-08 | End: 2023-03-08 | Stop reason: RX

## 2023-03-08 RX ORDER — GEMFIBROZIL 600 MG/1
600 TABLET, FILM COATED ORAL 2 TIMES DAILY
Status: DISCONTINUED | OUTPATIENT
Start: 2023-03-08 | End: 2023-03-09 | Stop reason: HOSPADM

## 2023-03-08 RX ORDER — WARFARIN SODIUM 3 MG/1
3 TABLET ORAL
Status: COMPLETED | OUTPATIENT
Start: 2023-03-08 | End: 2023-03-08

## 2023-03-08 RX ORDER — MAGNESIUM SULFATE IN WATER 40 MG/ML
2000 INJECTION, SOLUTION INTRAVENOUS PRN
Status: DISCONTINUED | OUTPATIENT
Start: 2023-03-08 | End: 2023-03-09 | Stop reason: HOSPADM

## 2023-03-08 RX ORDER — METOPROLOL SUCCINATE 50 MG/1
50 TABLET, EXTENDED RELEASE ORAL DAILY
Status: DISCONTINUED | OUTPATIENT
Start: 2023-03-08 | End: 2023-03-09 | Stop reason: HOSPADM

## 2023-03-08 RX ORDER — ACETAMINOPHEN 650 MG/1
650 SUPPOSITORY RECTAL EVERY 6 HOURS PRN
Status: DISCONTINUED | OUTPATIENT
Start: 2023-03-08 | End: 2023-03-09 | Stop reason: HOSPADM

## 2023-03-08 RX ORDER — POLYETHYLENE GLYCOL 3350 17 G/17G
17 POWDER, FOR SOLUTION ORAL DAILY PRN
Status: DISCONTINUED | OUTPATIENT
Start: 2023-03-08 | End: 2023-03-09 | Stop reason: HOSPADM

## 2023-03-08 RX ORDER — SODIUM CHLORIDE 0.9 % (FLUSH) 0.9 %
5-40 SYRINGE (ML) INJECTION PRN
Status: DISCONTINUED | OUTPATIENT
Start: 2023-03-08 | End: 2023-03-09 | Stop reason: HOSPADM

## 2023-03-08 RX ORDER — ONDANSETRON 2 MG/ML
4 INJECTION INTRAMUSCULAR; INTRAVENOUS EVERY 6 HOURS PRN
Status: DISCONTINUED | OUTPATIENT
Start: 2023-03-08 | End: 2023-03-09 | Stop reason: HOSPADM

## 2023-03-08 RX ORDER — SODIUM CHLORIDE 9 MG/ML
INJECTION, SOLUTION INTRAVENOUS PRN
Status: DISCONTINUED | OUTPATIENT
Start: 2023-03-08 | End: 2023-03-09 | Stop reason: HOSPADM

## 2023-03-08 RX ORDER — LANOLIN ALCOHOL/MO/W.PET/CERES
3 CREAM (GRAM) TOPICAL NIGHTLY PRN
Status: DISCONTINUED | OUTPATIENT
Start: 2023-03-08 | End: 2023-03-09 | Stop reason: HOSPADM

## 2023-03-08 RX ORDER — PRAVASTATIN SODIUM 40 MG
40 TABLET ORAL DAILY
Status: DISCONTINUED | OUTPATIENT
Start: 2023-03-08 | End: 2023-03-09 | Stop reason: HOSPADM

## 2023-03-08 RX ORDER — DEXAMETHASONE 4 MG/1
6 TABLET ORAL DAILY
Status: DISCONTINUED | OUTPATIENT
Start: 2023-03-08 | End: 2023-03-09 | Stop reason: HOSPADM

## 2023-03-08 RX ORDER — POTASSIUM CHLORIDE 20 MEQ/1
40 TABLET, EXTENDED RELEASE ORAL PRN
Status: DISCONTINUED | OUTPATIENT
Start: 2023-03-08 | End: 2023-03-09 | Stop reason: HOSPADM

## 2023-03-08 RX ORDER — POTASSIUM CHLORIDE 7.45 MG/ML
10 INJECTION INTRAVENOUS PRN
Status: DISCONTINUED | OUTPATIENT
Start: 2023-03-08 | End: 2023-03-09 | Stop reason: HOSPADM

## 2023-03-08 RX ADMIN — DEXAMETHASONE 6 MG: 4 TABLET ORAL at 18:45

## 2023-03-08 RX ADMIN — WARFARIN SODIUM 3 MG: 3 TABLET ORAL at 20:36

## 2023-03-08 RX ADMIN — SODIUM CHLORIDE, PRESERVATIVE FREE 10 ML: 5 INJECTION INTRAVENOUS at 21:01

## 2023-03-08 RX ADMIN — ACETAMINOPHEN 650 MG: 325 TABLET ORAL at 21:00

## 2023-03-08 RX ADMIN — PANTOPRAZOLE SODIUM 40 MG: 40 TABLET, DELAYED RELEASE ORAL at 20:33

## 2023-03-08 ASSESSMENT — ENCOUNTER SYMPTOMS
COUGH: 1
CONSTIPATION: 0
EYE PAIN: 0
VOMITING: 0
SHORTNESS OF BREATH: 1
SINUS PRESSURE: 1
NAUSEA: 0
WHEEZING: 0
SORE THROAT: 0
ABDOMINAL PAIN: 0
DIARRHEA: 0
RHINORRHEA: 1
BLOOD IN STOOL: 0

## 2023-03-08 ASSESSMENT — PAIN SCALES - GENERAL
PAINLEVEL_OUTOF10: 0
PAINLEVEL_OUTOF10: 0

## 2023-03-08 ASSESSMENT — PAIN DESCRIPTION - LOCATION: LOCATION: HEAD

## 2023-03-08 ASSESSMENT — PAIN DESCRIPTION - DESCRIPTORS: DESCRIPTORS: PRESSURE

## 2023-03-08 ASSESSMENT — PAIN - FUNCTIONAL ASSESSMENT: PAIN_FUNCTIONAL_ASSESSMENT: 0-10

## 2023-03-08 NOTE — ED PROVIDER NOTES
[facility]      EMERGENCY MEDICINE     Pt Name: Trevor Nissen  MRN: [id number]  Birthdate 11/23/1934  Date of evaluation: 3/8/2023  Provider: YVETTE Mac CNP    CHIEF COMPLAINT       Chief Complaint   Patient presents with    Shortness of Breath    Positive For Covid-19     HISTORY OF PRESENT ILLNESS   Trevor Nissen is a pleasant 80 y.o. male with a history of HTN, HLD and afib on Warfarin who presents to the emergency department  from [facility] walk in clinic , by private vehicle for evaluation of SOB. Pt states that he has had a productive cough, dull head pressure and SOB for a week that has progressively worsened. He was convinced by his family members to go to the walk in clinic for evaluation where he tested positive for COVID and had an abnormal chest x ray. His O2 sat on ambulation went down to the mid 80s there so they recommended he be seen in the ED for further evaluation. Upon interview the patient states that his SOB has worsened over the past few days and endorses a fever (tmax 101) congestion and rhinorrhea. Denies CP, hemoptysis, N/V/D, abdominal pain, edema.      PASTMEDICAL HISTORY     Past Medical History:   Diagnosis Date    Atrial fibrillation ([facility] [address]     H/O Bell's palsy     Hyperlipidemia     Hypertension        Patient Active Problem List   Diagnosis Code    Acute respiratory failure with hypoxia ([facility] [address] J96.01     SURGICAL HISTORY       Past Surgical History:   Procedure Laterality Date    COLONOSCOPY  2018    TOTAL HIP ARTHROPLASTY Right 2012       CURRENT MEDICATIONS       Discharge Medication List as of 3/9/2023 12:06 PM        CONTINUE these medications which have NOT CHANGED    Details   amLODIPine (NORVASC) 5 MG tablet TAKE 1 TABLET BY MOUTH ONCE DAILY FOR 90 DAYSHistorical Med      Cholecalciferol (VITAMIN D3) 50 MCG (2000 UT) TABS Take by mouthHistorical Med      calcium carbonate-vitamin D (CALTRATE) 600-400 MG-UNIT TABS per tab Take 1 tablet by mouth 2 times daily, Disp-60 tablet, [id number]      Pantoprazole Sodium (PROTONIX PO) Take 40 mg by mouth dailyHistorical Med      gemfibrozil (LOPID) 600 MG tablet Take 600 mg by mouth 2 times daily       metoprolol succinate (TOPROL XL) 50 MG extended release tablet Take 50 mg by mouth daily       pravastatin (PRAVACHOL) 40 MG tablet Take 40 mg by mouth daily       warfarin (COUMADIN) 3 MG tablet AS DIRECTED BY DR ALVAREZ      aspirin 81 MG tablet Take 81 mg by mouth daily      Omega-3 Fatty Acids (FISH OIL) 1200 MG CAPS Take 1 capsule by mouth daily             ALLERGIES     has No Known Allergies. FAMILY HISTORY     He indicated that the status of his father is unknown. SOCIAL HISTORY       Social History     Tobacco Use    Smoking status: Former     Types: Cigarettes     Quit date: 1994     Years since quittin.1    Smokeless tobacco: Never   Substance Use Topics    Alcohol use: Yes    Drug use: No       REVIEW OF SYSTEMS     Review of Systems   Constitutional:  Positive for fatigue and fever. Negative for appetite change, chills and unexpected weight change. HENT:  Positive for congestion, rhinorrhea and sinus pressure. Negative for ear pain and sore throat. Eyes:  Negative for pain and visual disturbance. Respiratory:  Positive for cough and shortness of breath. Negative for wheezing. Cardiovascular:  Negative for chest pain, palpitations and leg swelling. Gastrointestinal:  Negative for abdominal pain, blood in stool, constipation, diarrhea, nausea and vomiting. Genitourinary:  Negative for dysuria, frequency and hematuria. Musculoskeletal:  Negative for arthralgias and joint swelling. Skin:  Negative for rash. Neurological:  Negative for dizziness, syncope, weakness, light-headedness and headaches. Hematological:  Does not bruise/bleed easily.      PHYSICAL EXAM       ED Triage Vitals [23 1239]   BP Temp Temp src Heart Rate Resp SpO2 Height Weight   107/63 99.5 °F (37.5 °C) -- 78 20 95 % -- --       Additional Vital Signs:  Vitals:    03/09/23 0818   BP: 124/68   Pulse: 62   Resp: 18   Temp: 97.5 °F (36.4 °C)   SpO2: 97%     Physical Exam  Vitals and nursing note reviewed. Constitutional:       General: He is not in acute distress. Appearance: He is well-developed. He is obese. He is not diaphoretic. HENT:      Head: Normocephalic and atraumatic. Mouth/Throat:      Pharynx: Posterior oropharyngeal erythema present. Eyes:      Conjunctiva/sclera: Conjunctivae normal.      Pupils: Pupils are equal, round, and reactive to light. Cardiovascular:      Rate and Rhythm: Normal rate. Rhythm irregular. Heart sounds: Normal heart sounds. No murmur heard. No gallop. Pulmonary:      Effort: No respiratory distress. Breath sounds: No wheezing, rhonchi or rales. Comments: On 2 L NC  Abdominal:      General: Bowel sounds are normal.      Palpations: Abdomen is soft. Musculoskeletal:         General: Normal range of motion. Cervical back: Normal range of motion. Right lower leg: No edema. Left lower leg: No edema. Skin:     General: Skin is warm and dry. Neurological:      Mental Status: He is alert and oriented to person, place, and time. FORMAL DIAGNOSTIC RESULTS     RADIOLOGY: Interpretation per the Radiologist below, if available at the time of this note (none if blank):    XR CHEST PORTABLE   Final Result   1. Moderate cardiomegaly. 2. Tiny bilateral pleural effusions. 3. Very mild bibasilar atelectasis/pneumonia. **This report has been created using voice recognition software. It may contain minor errors which are inherent in voice recognition technology. **      Final report electronically signed by Dr. Leroy Ochoa on 3/8/2023 1:11 PM          LABS: (none if blank)  Labs Reviewed   COVID-19 & INFLUENZA COMBO - Abnormal; Notable for the following components:       Result Value    SARS-CoV-2 RNA, RT PCR DETECTED (*) All other components within normal limits   CBC WITH AUTO DIFFERENTIAL - Abnormal; Notable for the following components:    MCHC 31.9 (*)     RDW-CV 14.6 (*)     RDW-SD 49.1 (*)     All other components within normal limits   COMPREHENSIVE METABOLIC PANEL W/ REFLEX TO MG FOR LOW K - Abnormal; Notable for the following components:    CO2 21 (*)     ALT 8 (*)     All other components within normal limits   D-DIMER, QUANTITATIVE - Abnormal; Notable for the following components:    D-Dimer, Quant 697.00 (*)     All other components within normal limits   OSMOLALITY - Abnormal; Notable for the following components:    Osmolality Calc 271.4 (*)     All other components within normal limits   PROTIME-INR - Abnormal; Notable for the following components:    INR 1.72 (*)     All other components within normal limits   BASIC METABOLIC PANEL W/ REFLEX TO MG FOR LOW K - Abnormal; Notable for the following components:    CO2 20 (*)     Glucose 146 (*)     BUN 25 (*)     All other components within normal limits   CBC WITH AUTO DIFFERENTIAL - Abnormal; Notable for the following components:    MCHC 31.8 (*)     RDW-SD 48.8 (*)     Lymphocytes Absolute 0.9 (*)     Monocytes Absolute 0.2 (*)     All other components within normal limits   PROTIME-INR - Abnormal; Notable for the following components:    INR 1.81 (*)     All other components within normal limits   LACTIC ACID   ANION GAP   GLOMERULAR FILTRATION RATE, ESTIMATED   PROCALCITONIN   ANION GAP   GLOMERULAR FILTRATION RATE, ESTIMATED       (Any cultures that may have been sent were not resulted at the time of this patient visit)    [address] / ED COURSE:     1) Number and Complexity of Problems            Problem List This Visit:         Chief Complaint   Patient presents with    Shortness of Breath    Positive For Covid-19            Differential Diagnosis includes (but not limited to):   COVID-19, COPD D exacerbation, CHF exacerbation, pneumonia or pulmonary embolus            Pertinent Comorbid Conditions:    Obesity, hypertension, hyperlipidemia and A-fib    2)  Data Reviewed (none if left blank)          My Independent interpretations:     EKG:      A-fib with a ventricular rate of 70s    Imaging: Chest x-ray reveals mild bibasilar atelectasis    Labs:      D-dimer is mildly elevated. External Documentation Reviewed:         Previous patient encounter documents & history available on EMR was reviewed office visit from 1/18/2023             See Formal Diagnostic Results above for the lab and radiology tests and orders. 3)  Treatment and Disposition         ED Reassessment: Patient remained stable. O2 saturation drops into the 80s with ambulation. Case discussed with consulting clinician: Hospitalist for admission. Shared Decision-Making was performed and disposition discussed with the        Patient/Family and questions answered. Social determinants of health impacting treatment or disposition:  none         Code Status:  full      Summary of Patient Presentation:      MDM  /  ED Course as of 03/10/23 1256   Wed Mar 08, 2023   1310 XR CHEST PORTABLE [NW]      ED Course User Index  [NW] YVETTE Lee CNP     Vitals Reviewed:    Vitals:    03/08/23 2058 03/08/23 2333 03/09/23 0341 03/09/23 0818   BP: (!) 107/58 121/64 (!) 141/79 124/68   Pulse: 67 53 59 62   Resp: 20 19 18 18   Temp: (!) 100.5 °F (38.1 °C) 97.4 °F (36.3 °C) 97.1 °F (36.2 °C) 97.5 °F (36.4 °C)   TempSrc: Oral Oral Oral Oral   SpO2: 93% 93% 95% 97%   Weight:   212 lb 1.3 oz (96.2 kg)    Height:           The patient was seen and examined. Appropriate diagnostic testing was performed and results reviewed with the patient. The results of pertinent diagnostic studies and exam findings were discussed. The patients provisional diagnosis and plan of care were discussed with the patient and present family who expressed understanding.   Patient is being admitted to hospitalist for definitive care. Patient's O2 saturation did drop down to the 80s with ambulation. ED Medications administered this visit:  (None if blank)      PROCEDURES: (None if blank)  Procedures:     CRITICAL CARE: (None if blank)      DISCHARGE PRESCRIPTIONS: (None if blank)  Discharge Medication List as of 3/9/2023 12:06 PM          FINAL IMPRESSION      1. COVID-19    2. Dyspnea and respiratory abnormalities    3.  Pneumonia of both lower lobes due to infectious organism          DISPOSITION/PLAN   DISPOSITION Admitted 03/08/2023 03:50:56 PM      OUTPATIENT FOLLOW UP THE PATIENT:  [address]  [phone]    Follow up on 3/16/2023  appointment time 11:30am., Please wear a mask    Tiana German, APRN - CNP       Tiana German, APRN - CNP  03/10/23 9981

## 2023-03-08 NOTE — H&P
Hospitalist History & Physical    Patient:  La Lewis    Unit/Bed:16/016A  YOB: 1934  MRN: [id number]   Acct: [de-identified]   PCP: Mg Hill MD  Code Status: No Order    Date of Service: Pt seen/examined on 03/08/23 and admitted to Inpatient with expected LOS greater than two midnights due to medical therapy. Chief Complaint: shortness of breath    Assessment/Plan:    Hypoxia with Ambulation due to COVID19: questionable hypoxia- desaturated to 88% on RA while ambulating per ER. Tested positive for COVID19. CXR shows bibasilar infiltrates/atelectasis. No WBC elevation or left shift. Procal pending. Inflammatory markers pending. Will not qualify for baricitinib. Dexamethasone 6 mg x10 days. Encourage pulmonary hygiene. Atrial Fibrillation on [address] and [address]: On warfarin (pharmacy to dose). Amiodarone, and metoprolol. Continue to monitor on tele. Prostate CA: Diagnosed in 2019, bone scan and CT 3/2019 negative for metastatic disease. Intermittent therapy of Lupron and Prolia. Essential HTN: continue home medications and monitor    Hyperlipidemia: continue statin and lopid    Obesity: BMI 33.85 kg/m2      History of Present Illness:  La Lewis is a 80 y.o. male with PMHx of prostate CA, HTN, atrial fibrillation, and hyperlipidemia who [facility] to [facility] with chief complaint of shortness of breath. Patient reports about 2 weeks of ongoing shortness of breath, especially with ambulation. Notes acute worsening yesterday. He reports a nonproductive cough and a fever. Today, he felt very congested and \"like his head was going to explode\". States his temperature was 102 at a walk-in-clinic this morning. When he was at the clinic, his O2 saturation went to the mid 80s and it was instructed to go to the ER for further evaluation. Patient did admit to some dizziness with ambulation. He denied chest pain, headache, N/V/D, abdominal pain, and leg swelling.        Review of Systems: Pertinent positives as noted in the HPI. All other systems reviewed and negative. Past Medical History:        Diagnosis Date    Atrial fibrillation ([address]     H/O Bell's palsy     Hyperlipidemia     Hypertension        Past Surgical History:        Procedure Laterality Date    COLONOSCOPY  2018    TOTAL HIP ARTHROPLASTY Right 2012       Home Medications:   No current facility-administered medications on file prior to encounter. Current Outpatient Medications on File Prior to Encounter   Medication Sig Dispense Refill    amLODIPine (NORVASC) 5 MG tablet TAKE 1 TABLET BY MOUTH ONCE DAILY FOR 90 DAYS      Cholecalciferol (VITAMIN D3) 50 MCG (2000 UT) TABS Take by mouth      calcium carbonate-vitamin D (CALTRATE) 600-400 MG-UNIT TABS per tab Take 1 tablet by mouth 2 times daily 60 tablet 5    Pantoprazole Sodium (PROTONIX PO) Take 40 mg by mouth daily      gemfibrozil (LOPID) 600 MG tablet Take 600 mg by mouth 2 times daily       metoprolol succinate (TOPROL XL) 50 MG extended release tablet Take 50 mg by mouth daily       pravastatin (PRAVACHOL) 40 MG tablet Take 40 mg by mouth daily       warfarin (COUMADIN) 3 MG tablet AS DIRECTED BY DR ALVAREZ      aspirin 81 MG tablet Take 81 mg by mouth daily      Omega-3 Fatty Acids (FISH OIL) 1200 MG CAPS Take 1 capsule by mouth daily         Allergies:    Patient has no known allergies. Social History:    reports that he quit smoking about 29 years ago. His smoking use included cigarettes. He has never used smokeless tobacco. He reports current alcohol use. He reports that he does not use drugs. Family History:       Problem Relation Age of Onset    Heart Disease Father        Diet:  No diet orders on file      Physical Exam:  /71   Pulse 71   Temp 99.5 °F (37.5 °C)   Resp 24   SpO2 94%   General:   Pleasant male resting comfortably in bed, no apparent distress. HEENT:  normocephalic and atraumatic. No scleral icterus. PERR. Neck: supple. No JVD. Trachea midline  Lungs: diminished to auscultation, no wheezing, rhonchi, or rales. No retractions  Cardiac: RRR without murmur. Abdomen: soft. Nontender. Bowel sounds positive. Extremities:  No clubbing, cyanosis, or edema x 4. Vasculature: capillary refill < 3 seconds. Palpable LE pulses bilaterally. Skin:  warm and dry. No rashes or lesions. Psych:  Alert and oriented x3. Affect appropriate  Neurologic:  No focal deficit. No seizures. Data: (All radiographs, tracings, PFTs, and imaging are personally viewed and interpreted unless otherwise noted)  Labs:   Recent Labs     03/08/23  1300   WBC 7.5   HGB 15.2   HCT 47.6        Recent Labs     03/08/23  1300      K 4.4      CO2 21*   BUN 18   CREATININE 1.1   CALCIUM 9.0     Recent Labs     03/08/23  1300   AST 16   ALT 8*   BILITOT 1.1   ALKPHOS 80     No results for input(s): INR in the last 72 hours. No results for input(s): Patrisha Meigs in the last 72 hours. Urinalysis:   Lab Results   Component Value Date/Time    NITRU NEGATIVE 07/07/2021 04:14 PM    WBCUA 0-2 07/07/2021 04:14 PM    BACTERIA NONE SEEN 07/07/2021 04:14 PM    RBCUA 3-5 07/07/2021 04:14 PM    BLOODU trace 05/18/2022 02:00 AM    BLOODU NEGATIVE 07/07/2021 04:14 PM    SPECGRAV 1.025 05/18/2022 02:00 AM    SPECGRAV 1.018 07/07/2021 04:14 PM    GLUCOSEU neg 05/18/2022 02:00 AM    GLUCOSEU Negative 12/11/2019 12:00 AM       Radiology:  XR CHEST PORTABLE   Final Result   1. Moderate cardiomegaly. 2. Tiny bilateral pleural effusions. 3. Very mild bibasilar atelectasis/pneumonia. **This report has been created using voice recognition software. It may contain minor errors which are inherent in voice recognition technology. **      Final report electronically signed by Dr. Enoch Buchanan on 3/8/2023 1:11 PM        XR CHEST PORTABLE    Result Date: 3/8/2023  PROCEDURE: XR CHEST PORTABLE CLINICAL INFORMATION: SOB, covid 19 COMPARISON: No prior study. TECHNIQUE: A single mobile view of the chest was obtained. 1. Moderate cardiomegaly. 2. Tiny bilateral pleural effusions. 3. Very mild bibasilar atelectasis/pneumonia. **This report has been created using voice recognition software. It may contain minor errors which are inherent in voice recognition technology. ** Final report electronically signed by Dr. Fatou Yarbrough on 3/8/2023 1:11 PM        Tele:   [x] yes             [] no      Thank you Neisha Pedroza MD for the opportunity to be involved in this patient's care.     Electronically signed by Da Kelley PA-C on 3/8/2023 at 4:12 PM

## 2023-03-08 NOTE — ED NOTES
SOB x1 week. COVID+. [facility] confirmed Pneumonia on CXR.       Layla Kumar RN  03/08/23 1055

## 2023-03-08 NOTE — ED NOTES
While ambulating in room pt O2 saturations dropped to 88% on RA.       Xavier Messina RN  03/08/23 3668

## 2023-03-08 NOTE — FLOWSHEET NOTE
03/08/23 1831   Safe Environment   Safety Measures Other (comment)  (VN attempted admissionx2 but pt not reposnding to prompts. Pt resting in bed no signs of distress noted.  Bren Aguila, BSRN she said she will get it done due to the patient and his wife being very hard of hearing.)

## 2023-03-08 NOTE — ED TRIAGE NOTES
Pt presents to ER with concern of increased sob. Pt seen a [facility] walk in clinic. Tested positive for covid 19. Reports o2 dropped to 80s while up.

## 2023-03-08 NOTE — ED NOTES
Pt transported to [facility]. Floor contacted prior to contact.      Aileen Crocker  03/08/23 7773

## 2023-03-09 VITALS
HEART RATE: 62 BPM | OXYGEN SATURATION: 97 % | HEIGHT: 68 IN | WEIGHT: 212.08 LBS | BODY MASS INDEX: 32.14 KG/M2 | DIASTOLIC BLOOD PRESSURE: 68 MMHG | SYSTOLIC BLOOD PRESSURE: 124 MMHG | RESPIRATION RATE: 18 BRPM | TEMPERATURE: 97.5 F

## 2023-03-09 LAB
ANION GAP SERPL CALC-SCNC: 13 MEQ/L (ref 8–16)
BASOPHILS ABSOLUTE: 0 THOU/MM3 (ref 0–0.1)
BASOPHILS NFR BLD AUTO: 0.6 %
BUN SERPL-MCNC: 25 MG/DL (ref 7–22)
CALCIUM SERPL-MCNC: 8.8 MG/DL (ref 8.5–10.5)
CHLORIDE SERPL-SCNC: 105 MEQ/L (ref 98–111)
CO2 SERPL-SCNC: 20 MEQ/L (ref 23–33)
CREAT SERPL-MCNC: 1 MG/DL (ref 0.4–1.2)
DEPRECATED RDW RBC AUTO: 48.8 FL (ref 35–45)
EOSINOPHIL NFR BLD AUTO: 0 %
EOSINOPHILS ABSOLUTE: 0 THOU/MM3 (ref 0–0.4)
ERYTHROCYTE [DISTWIDTH] IN BLOOD BY AUTOMATED COUNT: 14.4 % (ref 11.5–14.5)
GFR SERPL CREATININE-BSD FRML MDRD: > 60 ML/MIN/1.73M2
GLUCOSE SERPL-MCNC: 146 MG/DL (ref 70–108)
HCT VFR BLD AUTO: 46.8 % (ref 42–52)
HGB BLD-MCNC: 14.9 GM/DL (ref 14–18)
IMM GRANULOCYTES # BLD AUTO: 0.03 THOU/MM3 (ref 0–0.07)
IMM GRANULOCYTES NFR BLD AUTO: 0.6 %
INR PPP: 1.81 (ref 0.85–1.13)
LYMPHOCYTES ABSOLUTE: 0.9 THOU/MM3 (ref 1–4.8)
LYMPHOCYTES NFR BLD AUTO: 17.9 %
MCH RBC QN AUTO: 29.2 PG (ref 26–33)
MCHC RBC AUTO-ENTMCNC: 31.8 GM/DL (ref 32.2–35.5)
MCV RBC AUTO: 91.6 FL (ref 80–94)
MONOCYTES ABSOLUTE: 0.2 THOU/MM3 (ref 0.4–1.3)
MONOCYTES NFR BLD AUTO: 3.8 %
NEUTROPHILS NFR BLD AUTO: 77.1 %
NRBC BLD AUTO-RTO: 0 /100 WBC
PLATELET # BLD AUTO: 192 THOU/MM3 (ref 130–400)
PMV BLD AUTO: 10 FL (ref 9.4–12.4)
POTASSIUM SERPL-SCNC: 4.2 MEQ/L (ref 3.5–5.2)
RBC # BLD AUTO: 5.11 MILL/MM3 (ref 4.7–6.1)
SEGMENTED NEUTROPHILS ABSOLUTE COUNT: 3.7 THOU/MM3 (ref 1.8–7.7)
SODIUM SERPL-SCNC: 138 MEQ/L (ref 135–145)
WBC # BLD AUTO: 4.8 THOU/MM3 (ref 4.8–10.8)

## 2023-03-09 PROCEDURE — 80048 BASIC METABOLIC PNL TOTAL CA: CPT

## 2023-03-09 PROCEDURE — 2580000003 HC RX 258

## 2023-03-09 PROCEDURE — 85610 PROTHROMBIN TIME: CPT

## 2023-03-09 PROCEDURE — 85025 COMPLETE CBC W/AUTO DIFF WBC: CPT

## 2023-03-09 PROCEDURE — 99239 HOSP IP/OBS DSCHRG MGMT >30: CPT | Performed by: PHYSICIAN ASSISTANT

## 2023-03-09 PROCEDURE — 6370000000 HC RX 637 (ALT 250 FOR IP)

## 2023-03-09 PROCEDURE — 94669 MECHANICAL CHEST WALL OSCILL: CPT

## 2023-03-09 PROCEDURE — 6370000000 HC RX 637 (ALT 250 FOR IP): Performed by: PHYSICIAN ASSISTANT

## 2023-03-09 PROCEDURE — 36415 COLL VENOUS BLD VENIPUNCTURE: CPT

## 2023-03-09 PROCEDURE — 94761 N-INVAS EAR/PLS OXIMETRY MLT: CPT

## 2023-03-09 PROCEDURE — 6360000002 HC RX W HCPCS

## 2023-03-09 RX ADMIN — METOPROLOL SUCCINATE 50 MG: 50 TABLET, EXTENDED RELEASE ORAL at 08:23

## 2023-03-09 RX ADMIN — BENZOCAINE 6 MG-MENTHOL 10 MG LOZENGES 1 LOZENGE: at 05:48

## 2023-03-09 RX ADMIN — DEXAMETHASONE 6 MG: 4 TABLET ORAL at 08:23

## 2023-03-09 RX ADMIN — GEMFIBROZIL 600 MG: 600 TABLET ORAL at 08:23

## 2023-03-09 RX ADMIN — PANTOPRAZOLE SODIUM 40 MG: 40 TABLET, DELAYED RELEASE ORAL at 08:23

## 2023-03-09 RX ADMIN — PRAVASTATIN SODIUM 40 MG: 40 TABLET ORAL at 08:23

## 2023-03-09 RX ADMIN — ASPIRIN 81 MG 81 MG: 81 TABLET ORAL at 08:23

## 2023-03-09 RX ADMIN — AMLODIPINE BESYLATE 5 MG: 5 TABLET ORAL at 08:23

## 2023-03-09 RX ADMIN — SODIUM CHLORIDE, PRESERVATIVE FREE 10 ML: 5 INJECTION INTRAVENOUS at 08:23

## 2023-03-09 RX ADMIN — Medication 1 TABLET: at 08:23

## 2023-03-09 RX ADMIN — GUAIFENESIN AND DEXTROMETHORPHAN 5 ML: 100; 10 SYRUP ORAL at 05:47

## 2023-03-09 NOTE — CARE COORDINATION
Case Management Assessment  Initial Evaluation    Date/Time of Evaluation: 3/9/2023 11:21 AM  Assessment Completed by: Lilton Goodell, RN    If patient is discharged prior to next notation, then this note serves as note for discharge by case management. Patient Name: Costa Baptiste                   YOB: 1934  Diagnosis: Dyspnea and respiratory abnormalities [R06.00, R06.89]  Acute respiratory failure with hypoxia (HCC) [J96.01]  Pneumonia of both lower lobes due to infectious organism [J18.9]  COVID-19 [U07.1]                   Date / Time: 3/8/2023 12:38 PM  Location: [address] [place] [address]     Patient Admission Status: Inpatient   Readmission Risk Low 0-14, Mod 15-19), High > 20: Readmission Risk Score: 12.5    Current PCP: Vin Martinez MD  PCP verified by ? Yes    Chart Reviewed: Yes      History Provided by: Patient  Patient Orientation: Alert and Oriented    Patient Cognition: Alert    Hospitalization in the last 30 days (Readmission):  No    If yes, Readmission Assessment in  Navigator will be completed. Advance Directives:      Code Status: Full Code   Patient's Primary Decision Maker is: Patient Declined (Legal Next of Kin Remains as Decision Maker) (states he believes he already has at home)      Discharge Planning:    Patient lives with: Alone Type of Home: House  Primary Care Giver: Self  Patient Support Systems include: Family Members   Current Financial resources: Medicare  Current community resources: None  Current services prior to admission: Durable Medical Equipment, Other (Comment) (housekeeping)            Current DME: [employer]            Type of Home Care services:  Housekeeping    ADLS  Prior functional level: Assistance with the following:, Housework  Current functional level: Assistance with the following:, Housework    Family can provide assistance at DC:  Yes  Would you like Case Management to discuss the discharge plan with any other family members/significant others, and if so, who? No  Plans to Return to Present Housing: Yes  Other Identified Issues/Barriers to RETURNING to current housing: ***  Potential Assistance needed at discharge: N/A            Potential DME:    Patient expects to discharge to:    Plan for transportation at discharge: Family    Financial    Payor: MEDICARE / Plan: MEDICARE PART A AND B / Product Type: *No Product type* /     Does insurance require precert for SNF: No    Potential assistance Purchasing Medications: No  Meds-to-Beds request: Yes      [address]  [address]  [address] [facility]  Phone: [phone] Fax: [phone]      Notes:    Factors facilitating achievement of predicted outcomes: {Patient Strengths:735132348}    Barriers to discharge: {Barriers:572301393}    Additional Case Management Notes: covid+. Tmax 100.5. 95% RA. PO decadron. IS, acapella. PT/OT. The Plan for Transition of Care is related to the following treatment goals of Dyspnea and respiratory abnormalities [R06.00, R06.89]  Acute respiratory failure with hypoxia (HCC) [J96.01]  Pneumonia of both lower lobes due to infectious organism [J18.9]  COVID-19 [U07.1]    Patient Goals/Plan/Treatment Preferences: ***  Transportation/Food Security/Housekeeping Addressed: No issues identified.      Ruthie Sahni RN  Case Management Department

## 2023-03-09 NOTE — PROGRESS NOTES
A home oxygen evaluation has been completed. [x]Patient is an inpatient. It is expected that the patient will be discharged within the next 48 hours. Qualified provider to write orders for possible sleep study or home oxygen prescription. Social service/care managers will arrange for home oxygen if ordered. If patient is active, arrange for Home Medical supplier to assess for Oxygen Conserving Device per pulse oximetry. []Patient is an outpatient. Results will be faxed to the ordering provider. Qualified provider to write orders for possible sleep study or home oxygen prescription. Patient was placed on room air for all day. SpO2 was 94 % on room air at rest. Patients SpO2 was 89% or above and did not qualify for home oxygen. Patient was walked for 5 minutes. SpO2 was 90 % during walking. Patients SpO2 was 89% or above and did not qualify for home oxygen.

## 2023-03-09 NOTE — PROGRESS NOTES
Physician Progress Note      PATIENT:               Dvaid Shabazz  CSN #:                  [id number]  :                       1934  ADMIT DATE:       3/8/2023 12:38 PM  [address] [place] DATE:    PROVIDER #:        Opal Bolanos PA-C          QUERY TEXTDannial Jasmeet    Pt admitted with COVID 19. Per ED Physician: COVID 19 PNA. If possible,   please document in progress notes and discharge summary:    The medical record reflects the following:  Risk Factors: COVID 19  Clinical Indicators: Per ED Physician: COVID 19 PNA. CXR:  Very mild bibasilar   atelectasis/pneumonia. Temp 100.5. Pt O2 saturations dropped to 88% on RA. Treatment: Tylenol & Decadron    Marya SALVADORN, RN  Options provided:  -- COVID 19 PNA confirmed present on admission  -- COVID 19 PNA ruled out  -- Other - I will add my own diagnosis  -- Disagree - Not applicable / Not valid  -- Disagree - Clinically unable to determine / Unknown  -- Refer to Clinical Documentation Reviewer    PROVIDER RESPONSE TEXT:    The diagnosis of COVID 19 PNA was confirmed as present on admission. Query created by: Carlotta Plasencia on 3/9/2023 7:07 AM      QUERY TEXTDannial Jasmeet,    Pt admitted with COVID 19 noted to have atrial fibrillation and is maintained   on Coumadin. If possible, please document in progress notes and discharge   summary if you are evaluating and/or treating any of the following: The medical record reflects the following:  Risk Factors: AFIB  Clinical Indicators: AFIB. INR 1.81.   Treatment: Coumadin  Options provided:  -- Secondary hypercoagulable state in a patient with atrial fibrillation  -- No Secondary hypercoagulable state in a patient with atrial fibrillation  -- Other - I will add my own diagnosis  -- Disagree - Not applicable / Not valid  -- Disagree - Clinically unable to determine / Unknown  -- Refer to Clinical Documentation Reviewer    PROVIDER RESPONSE TEXT:    This patient does not have secondary hypercoagulable state in a patient with   atrial fibrillation.     Query created by: Queen Katharina on 3/9/2023 7:14 AM      Electronically signed by:  Ashlie Castano PA-C 3/9/2023 8:54 AM

## 2023-03-09 NOTE — PLAN OF CARE
Problem: Discharge Planning  Goal: Discharge to home or other facility with appropriate resources  Outcome: Progressing  Flowsheets (Taken 3/8/2023 1730 by Fara Banuelos RN)  Discharge to home or other facility with appropriate resources: Identify barriers to discharge with patient and caregiver     Problem: ABCDS Injury Assessment  Goal: Absence of physical injury  Outcome: Progressing  Flowsheets (Taken 3/9/2023 0346)  Absence of Physical Injury: Implement safety measures based on patient assessment     Problem: Pain  Goal: Verbalizes/displays adequate comfort level or baseline comfort level  Outcome: Progressing  Flowsheets (Taken 3/9/2023 0346)  Verbalizes/displays adequate comfort level or baseline comfort level:   Encourage patient to monitor pain and request assistance   Assess pain using appropriate pain scale   Administer analgesics based on type and severity of pain and evaluate response   Implement non-pharmacological measures as appropriate and evaluate response     Problem: Respiratory - Adult  Goal: Achieves optimal ventilation and oxygenation  Outcome: Progressing  Flowsheets (Taken 3/9/2023 0346)  Achieves optimal ventilation and oxygenation:   Assess for changes in respiratory status   Assess for changes in mentation and behavior   Position to facilitate oxygenation and minimize respiratory effort   Oxygen supplementation based on oxygen saturation or arterial blood gases   Assess and instruct to report shortness of breath or any respiratory difficulty   Assess the need for suctioning and aspirate as needed   Encourage broncho-pulmonary hygiene including cough, deep breathe, incentive spirometry     Problem: Cardiovascular - Adult  Goal: Maintains optimal cardiac output and hemodynamic stability  Outcome: Progressing  Flowsheets (Taken 3/9/2023 0346)  Maintains optimal cardiac output and hemodynamic stability:   Monitor blood pressure and heart rate   Monitor urine output and notify Licensed Independent Practitioner for values outside of normal range   Assess for signs of decreased cardiac output  Goal: Absence of cardiac dysrhythmias or at baseline  Outcome: Progressing  Flowsheets (Taken 3/9/2023 8586)  Absence of cardiac dysrhythmias or at baseline:   Monitor cardiac rate and rhythm   Assess for signs of decreased cardiac output   Administer antiarrhythmia medication and electrolyte replacement as ordered

## 2023-03-09 NOTE — FLOWSHEET NOTE
03/09/23 1042   Safe Environment   Safety Measures Visitors at bedside;Call light within reach  (Virtual nurse safety round completed)   Patient awake and alert and  is sitting with visitors on couch in room. Call light in reach. Referred patient to page 13 of the handbook for fall prevention review.

## 2023-03-09 NOTE — PLAN OF CARE
Problem: Discharge Planning  Goal: Discharge to home or other facility with appropriate resources  3/9/2023 0947 by Jayda   Outcome: Progressing  Flowsheets (Taken 3/9/2023 0818)  Discharge to home or other facility with appropriate resources:   Identify barriers to discharge with patient and caregiver   Arrange for needed discharge resources and transportation as appropriate  3/9/2023 0346 by Braulio Middleton RN  Outcome: Progressing  Flowsheets (Taken 3/8/2023 1730 by Rohan Eller RN)  Discharge to home or other facility with appropriate resources: Identify barriers to discharge with patient and caregiver     Problem: ABCDS Injury Assessment  Goal: Absence of physical injury  3/9/2023 0947 by Jayda   Outcome: Progressing  3/9/2023 0346 by Braulio Middleton RN  Outcome: Progressing  Flowsheets (Taken 3/9/2023 0346)  Absence of Physical Injury: Implement safety measures based on patient assessment     Problem: Pain  Goal: Verbalizes/displays adequate comfort level or baseline comfort level  3/9/2023 0947 by Jayda   Outcome: Progressing  Flowsheets (Taken 3/9/2023 0818)  Verbalizes/displays adequate comfort level or baseline comfort level:   Encourage patient to monitor pain and request assistance   Assess pain using appropriate pain scale  3/9/2023 0346 by Braulio Middleton RN  Outcome: Progressing  Flowsheets (Taken 3/9/2023 0346)  Verbalizes/displays adequate comfort level or baseline comfort level:   Encourage patient to monitor pain and request assistance   Assess pain using appropriate pain scale   Administer analgesics based on type and severity of pain and evaluate response   Implement non-pharmacological measures as appropriate and evaluate response     Problem: Respiratory - Adult  Goal: Achieves optimal ventilation and oxygenation  3/9/2023 0947 by Em Bello  Outcome: Progressing  3/9/2023 0346 by Braulio Middleton RN  Outcome: Progressing  Flowsheets (Taken 3/9/2023 0346)  Carla Walker optimal ventilation and oxygenation:   Assess for changes in respiratory status   Assess for changes in mentation and behavior   Position to facilitate oxygenation and minimize respiratory effort   Oxygen supplementation based on oxygen saturation or arterial blood gases   Assess and instruct to report shortness of breath or any respiratory difficulty   Assess the need for suctioning and aspirate as needed   Encourage broncho-pulmonary hygiene including cough, deep breathe, incentive spirometry     Problem: Cardiovascular - Adult  Goal: Maintains optimal cardiac output and hemodynamic stability  3/9/2023 0947 by Joseph Bello  Outcome: Progressing  Flowsheets (Taken 3/9/2023 0818)  Maintains optimal cardiac output and hemodynamic stability:   Monitor blood pressure and heart rate   Monitor urine output and notify Licensed Independent Practitioner for values outside of normal range  3/9/2023 0346 by Clayton Louis RN  Outcome: Progressing  Flowsheets (Taken 3/9/2023 0346)  Maintains optimal cardiac output and hemodynamic stability:   Monitor blood pressure and heart rate   Monitor urine output and notify Licensed Independent Practitioner for values outside of normal range   Assess for signs of decreased cardiac output  Goal: Absence of cardiac dysrhythmias or at baseline  3/9/2023 0947 by Jayda Hernandez  Outcome: Progressing  Flowsheets (Taken 3/9/2023 0818)  Absence of cardiac dysrhythmias or at baseline:   Monitor cardiac rate and rhythm   Assess for signs of decreased cardiac output  3/9/2023 0346 by Clayton Louis RN  Outcome: Progressing  Flowsheets (Taken 3/9/2023 0346)  Absence of cardiac dysrhythmias or at baseline:   Monitor cardiac rate and rhythm   Assess for signs of decreased cardiac output   Administer antiarrhythmia medication and electrolyte replacement as ordered

## 2023-03-10 NOTE — DISCHARGE SUMMARY
Hospitalist Discharge Summary    Patient: Sabino Flores  YOB: 1934  MRN: [id number]   Acct: [de-identified]    Primary Care Physician: Anne Marie Cortes MD    Admit date  3/8/2023    Discharge date: 3/9/2023     Discharge Assessment and Plan:    COVID-19: questionable hypoxia- desaturated to 88% on RA while ambulating per ER. However, pt has remained stable on RA since admission and has been ambulating without hypoxia or distress. No need for continued dexamethasone at discharge. Supportive care. Alarming s/s discussed with the patient. Follow up with PCP     Atrial Fibrillation on [address] and [place]: On warfarin. Amiodarone, and metoprolol. Prostate CA: Diagnosed in 2019, bone scan and CT 3/2019 negative for metastatic disease. Intermittent therapy of Lupron and Prolia    Essential HTN: continue home medications    Hyperlipidemia: continue statin and lopid    Obesity: BMI 33.85 kg/m2        Chief Complaint on presentation: SOB    Initial H&P / Hospital Course: HPI: \"Pete Bar is a 80 y.o. male with PMHx of prostate CA, HTN, atrial fibrillation, and hyperlipidemia who presented to [facility] with chief complaint of shortness of breath. Patient reports about 2 weeks of ongoing shortness of breath, especially with ambulation. Notes acute worsening yesterday. He reports a nonproductive cough and a fever. Today, he felt very congested and \"like his head was going to explode\". States his temperature was 102 at a walk-in-clinic this morning. When he was at the clinic, his O2 saturation went to the mid 80s and it was instructed to go to the ER for further evaluation. Patient did admit to some dizziness with ambulation. He denied chest pain, headache, N/V/D, abdominal pain, and leg swelling. \"     Subjective (day of discharge): Patient has been doing well. He states that he becomes mildly short of breath after a lot of walking but this resovles with rest. Reports mild dry cough and fatigue.  No fever/chills, chest pain, SOB at rest, abd pain, nvd. Agreeable with discharge plan, questions answered and discussed with family. Patient responded well to medical management. The patient was discharged in stable condition with appropriate outpatient follow up arranged. Physical Exam:-  Vitals: No data found. Weight: Weight: 212 lb 1.3 oz (96.2 kg)   24 hour intake/output: No intake or output data in the 24 hours ending 03/10/23 0923    General appearance: No apparent distress, appears stated age and cooperative. HEENT: Normal cephalic, atraumatic without obvious deformity. Pupils equal, round, and reactive to light. Extra ocular muscles intact. Conjunctivae/corneas clear. Neck: Supple, with full range of motion. No jugular venous distention. Trachea midline. Respiratory:  Normal respiratory effort. Clear to auscultation, bilaterally without Rales/Wheezes/Rhonchi. Cardiovascular: Regular rate and rhythm with normal S1/S2 without murmurs, rubs or gallops. Abdomen: Soft, non-tender, non-distended with normal bowel sounds. Musculoskeletal:  No clubbing, cyanosis or edema bilaterally. Skin: Skin color, texture, turgor normal.  No rashes or lesions. Neurologic:  Neurovascularly intact without any focal sensory/motor deficits.  Cranial nerves: II-XII intact, grossly non-focal.  Psychiatric: Alert and oriented, thought content appropriate, normal insight  Capillary Refill: Brisk,< 3 seconds   Peripheral Pulses: +2 palpable, equal bilaterally     Labs :  Recent Results (from the past 72 hour(s))   EKG 12 Lead    Collection Time: 03/08/23 12:37 PM   Result Value Ref Range    Ventricular Rate 72 BPM    QRS Duration 74 ms    Q-T Interval 394 ms    QTc Calculation (Bazett) 431 ms    R Axis -56 degrees    T Axis 22 degrees   CBC with Auto Differential    Collection Time: 03/08/23  1:00 PM   Result Value Ref Range    WBC 7.5 4.8 - 10.8 thou/mm3    RBC 5.21 4.70 - 6.10 mill/mm3    Hemoglobin 15.2 14.0 - 18.0 gm/dl    Hematocrit 47.6 42.0 - 52.0 %    MCV 91.4 80.0 - 94.0 fL    MCH 29.2 26.0 - 33.0 pg    MCHC 31.9 (L) 32.2 - 35.5 gm/dl    RDW-CV 14.6 (H) 11.5 - 14.5 %    RDW-SD 49.1 (H) 35.0 - 45.0 fL    Platelets 249 541 - 835 thou/mm3    MPV 10.0 9.4 - 12.4 fL    Seg Neutrophils 66.5 %    Lymphocytes 14.3 %    Monocytes 17.0 %    Eosinophils 0.9 %    Basophils 0.8 %    Immature Granulocytes 0.5 %    Segs Absolute 5.0 1.8 - 7.7 thou/mm3    Lymphocytes Absolute 1.1 1.0 - 4.8 thou/mm3    Monocytes Absolute 1.3 0.4 - 1.3 thou/mm3    Eosinophils Absolute 0.1 0.0 - 0.4 thou/mm3    Basophils Absolute 0.1 0.0 - 0.1 thou/mm3    Immature Grans (Abs) 0.04 0.00 - 0.07 thou/mm3    nRBC 0 /100 wbc   Comprehensive Metabolic Panel w/ Reflex to MG    Collection Time: 03/08/23  1:00 PM   Result Value Ref Range    Glucose 88 70 - 108 mg/dL    Creatinine 1.1 0.4 - 1.2 mg/dL    BUN 18 7 - 22 mg/dL    Sodium 135 135 - 145 meq/L    Potassium reflex Magnesium 4.4 3.5 - 5.2 meq/L    Chloride 100 98 - 111 meq/L    CO2 21 (L) 23 - 33 meq/L    Calcium 9.0 8.5 - 10.5 mg/dL    AST 16 5 - 40 U/L    Alkaline Phosphatase 80 38 - 126 U/L    Total Protein 7.7 6.1 - 8.0 g/dL    Albumin 4.1 3.5 - 5.1 g/dL    Total Bilirubin 1.1 0.3 - 1.2 mg/dL    ALT 8 (L) 11 - 66 U/L   D-Dimer, Quantitative    Collection Time: 03/08/23  1:00 PM   Result Value Ref Range    D-Dimer, Quant 697.00 (H) 0.00 - 500.00 ng/ml FEU   Anion Gap    Collection Time: 03/08/23  1:00 PM   Result Value Ref Range    Anion Gap 14.0 8.0 - 16.0 meq/L   Glomerular Filtration Rate, Estimated    Collection Time: 03/08/23  1:00 PM   Result Value Ref Range    Est, Glom Filt Rate >60 >60 ml/min/1.73m2   Osmolality    Collection Time: 03/08/23  1:00 PM   Result Value Ref Range    Osmolality Calc 271.4 (L) 275.0 - 300.0 mOsmol/kg   COVID-19 & Influenza Combo    Collection Time: 03/08/23  1:00 PM    Specimen: Nasopharyngeal Swab   Result Value Ref Range    SARS-CoV-2 RNA, RT PCR DETECTED (AA) NOT DETECTED    INFLUENZA A NOT DETECTED NOT DETECTED    INFLUENZA B NOT DETECTED NOT DETECTED   Procalcitonin    Collection Time: 03/08/23  1:00 PM   Result Value Ref Range    Procalcitonin 0.07 0.01 - 0.09 ng/mL   Protime-INR    Collection Time: 03/08/23  1:00 PM   Result Value Ref Range    INR 1.72 (H) 0.85 - 1.13   Lactic Acid    Collection Time: 03/08/23  1:32 PM   Result Value Ref Range    Lactic Acid 1.1 0.5 - 2.0 mmol/L   Basic Metabolic Panel w/ Reflex to MG    Collection Time: 03/09/23  4:58 AM   Result Value Ref Range    Sodium 138 135 - 145 meq/L    Potassium reflex Magnesium 4.2 3.5 - 5.2 meq/L    Chloride 105 98 - 111 meq/L    CO2 20 (L) 23 - 33 meq/L    Glucose 146 (H) 70 - 108 mg/dL    BUN 25 (H) 7 - 22 mg/dL    Creatinine 1.0 0.4 - 1.2 mg/dL    Calcium 8.8 8.5 - 10.5 mg/dL   CBC with Auto Differential    Collection Time: 03/09/23  4:58 AM   Result Value Ref Range    WBC 4.8 4.8 - 10.8 thou/mm3    RBC 5.11 4.70 - 6.10 mill/mm3    Hemoglobin 14.9 14.0 - 18.0 gm/dl    Hematocrit 46.8 42.0 - 52.0 %    MCV 91.6 80.0 - 94.0 fL    MCH 29.2 26.0 - 33.0 pg    MCHC 31.8 (L) 32.2 - 35.5 gm/dl    RDW-CV 14.4 11.5 - 14.5 %    RDW-SD 48.8 (H) 35.0 - 45.0 fL    Platelets 797 676 - 274 thou/mm3    MPV 10.0 9.4 - 12.4 fL    Seg Neutrophils 77.1 %    Lymphocytes 17.9 %    Monocytes 3.8 %    Eosinophils 0.0 %    Basophils 0.6 %    Immature Granulocytes 0.6 %    Segs Absolute 3.7 1.8 - 7.7 thou/mm3    Lymphocytes Absolute 0.9 (L) 1.0 - 4.8 thou/mm3    Monocytes Absolute 0.2 (L) 0.4 - 1.3 thou/mm3    Eosinophils Absolute 0.0 0.0 - 0.4 thou/mm3    Basophils Absolute 0.0 0.0 - 0.1 thou/mm3    Immature Grans (Abs) 0.03 0.00 - 0.07 thou/mm3    nRBC 0 /100 wbc   Protime-INR    Collection Time: 03/09/23  4:58 AM   Result Value Ref Range    INR 1.81 (H) 0.85 - 1.13   Anion Gap    Collection Time: 03/09/23  4:58 AM   Result Value Ref Range    Anion Gap 13.0 8.0 - 16.0 meq/L   Glomerular Filtration Rate, Estimated    Collection Time: 03/09/23  4:58 AM   Result Value Ref Range    Est, Glom Filt Rate >60 >60 ml/min/1.73m2        Microbiology:    Blood culture #1: No results found for: BC  Blood culture #2:No results found for: BLOODCULT2  Organism:  No results found for: LABGRAM  MRSA culture only:No results found for: [facility]  Urine culture:   Lab Results   Component Value Date/Time    LABURIN  03/06/2019 03:37 PM     [place] count: 10,000 CFU/mL  This is a MRSA (Methicillin Resistant Staphylococcus  aureus)isolate. Isolates of MRSA (ORSA) Methicillin (Oxacillin) Resistant  Staphylococcus aureus (coagulase positive) require patient  be placed in CONTACT isolation. Methicillin(Oxacillin)resistant strains of staphylococci  (MRSA)or(MRSE)should be considered resistant to all classes  of cephalosporins, penems and beta-lactams. In the treatment of gram positive infections, GENTAMICIN  should be CONSIDERED a SYNERGYSTIC agent ONLY. LABURIN [place] count: 10,000 CFU/mL 03/06/2019 03:37 PM     Lab Results   Component Value Date/Time    ORG Staphylococcus aureus 03/06/2019 03:37 PM    ORG Enterococcus faecalis - (Group D) 03/06/2019 03:37 PM      Respiratory culture: No results found for: CULTRESP  Aerobic and Anaerobic :  No results found for: LABAERO  No results found for: LABANAE    Urinalysis:     Lab Results   Component Value Date/Time    NITRU NEGATIVE 07/07/2021 04:14 PM    WBCUA 0-2 07/07/2021 04:14 PM    BACTERIA NONE SEEN 07/07/2021 04:14 PM    RBCUA 3-5 07/07/2021 04:14 PM    BLOODU trace 05/18/2022 02:00 AM    BLOODU NEGATIVE 07/07/2021 04:14 PM    SPECGRAV 1.025 05/18/2022 02:00 AM    SPECGRAV 1.018 07/07/2021 04:14 PM    GLUCOSEU neg 05/18/2022 02:00 AM    GLUCOSEU Negative 12/11/2019 12:00 AM       Radiology:  XR CHEST PORTABLE    Result Date: 3/8/2023  PROCEDURE: XR CHEST PORTABLE CLINICAL INFORMATION: SOB, covid 19 COMPARISON: No prior study. TECHNIQUE: A single mobile view of the chest was obtained.      1. Moderate cardiomegaly. 2. Tiny bilateral pleural effusions. 3. Very mild bibasilar atelectasis/pneumonia. **This report has been created using voice recognition software. It may contain minor errors which are inherent in voice recognition technology. ** Final report electronically signed by Dr. Marilyn Mcdonald on 3/8/2023 1:11 PM       Consults:   IP CONSULT TO PHARMACY    Discharge Medications:      Medication List        CONTINUE taking these medications      amLODIPine 5 MG tablet  Commonly known as: NORVASC     aspirin 81 MG tablet     calcium carbonate-vitamin D 600-400 MG-UNIT Tabs per tab  Commonly known as: CALTRATE  Take 1 tablet by mouth 2 times daily     Fish Oil 1200 MG Caps     gemfibrozil 600 MG tablet  Commonly known as: LOPID     metoprolol succinate 50 MG extended release tablet  Commonly known as: TOPROL XL     pravastatin 40 MG tablet  Commonly known as: PRAVACHOL     PROTONIX PO     Vitamin D3 50 MCG (2000 UT) Tabs     warfarin 3 MG tablet  Commonly known as: COUMADIN               Patient Instructions:    Discharge lab work: none  Activity: activity as tolerated  Diet: No diet orders on file      Follow-up visits:   Arabella Ramon, [address] [facility]  [phone]    Follow up on 3/16/2023  appointment time 11:30am., Please wear a mask         Disposition: home  Condition at Discharge: Stable    Time Spent: 35 minutes    Signed: Thank you Naresh Cheek MD for the opportunity to be involved in this patient's care.     Electronically signed by Rupert Arnold PA-C on 3/10/2023 at 9:23 AM  Discharging Hospitalist

## 2023-04-01 LAB
EKG Q-T INTERVAL: 394 MS
EKG QRS DURATION: 74 MS
EKG QTC CALCULATION (BAZETT): 431 MS
EKG R AXIS: -56 DEGREES
EKG T AXIS: 22 DEGREES
EKG VENTRICULAR RATE: 72 BPM

## 2023-07-18 ENCOUNTER — TELEPHONE (OUTPATIENT)
Dept: UROLOGY | Age: 88
End: 2023-07-18

## 2023-07-18 LAB — PROSTATE SPECIFIC ANTIGEN: 0.36 NG/ML

## 2023-10-13 ENCOUNTER — TELEPHONE (OUTPATIENT)
Dept: CARDIOLOGY CLINIC | Age: 88
End: 2023-10-13

## 2023-10-18 ENCOUNTER — OFFICE VISIT (OUTPATIENT)
Dept: CARDIOLOGY CLINIC | Age: 88
End: 2023-10-18
Payer: MEDICARE

## 2023-10-18 VITALS
DIASTOLIC BLOOD PRESSURE: 68 MMHG | HEIGHT: 68 IN | SYSTOLIC BLOOD PRESSURE: 100 MMHG | HEART RATE: 56 BPM | WEIGHT: 220.8 LBS | BODY MASS INDEX: 33.46 KG/M2

## 2023-10-18 DIAGNOSIS — R42 DIZZINESS: Primary | ICD-10-CM

## 2023-10-18 DIAGNOSIS — I48.0 PAF (PAROXYSMAL ATRIAL FIBRILLATION) (HCC): ICD-10-CM

## 2023-10-18 PROCEDURE — G8417 CALC BMI ABV UP PARAM F/U: HCPCS | Performed by: INTERNAL MEDICINE

## 2023-10-18 PROCEDURE — G8484 FLU IMMUNIZE NO ADMIN: HCPCS | Performed by: INTERNAL MEDICINE

## 2023-10-18 PROCEDURE — G8427 DOCREV CUR MEDS BY ELIG CLIN: HCPCS | Performed by: INTERNAL MEDICINE

## 2023-10-18 PROCEDURE — 99204 OFFICE O/P NEW MOD 45 MIN: CPT | Performed by: INTERNAL MEDICINE

## 2023-10-18 NOTE — PROGRESS NOTES
08:08 AM    LDLCALC 113 02/21/2022 09:05 AM    LDLDIRECT 92 04/10/2023 08:08 AM       No results found for: \"TSH\"      Testing Reviewed:      I have individually reviewed the cardiac test below:    AssessmentPlan:     Dizziness   Borderline blood pressure  Atrial fibrillation   ChadsVasc score 3   Hypertension   Dyslipidemia     Patient has h/o atrial fibrillation, on Coumadin  He was referred to cardiology for h/o AF, Dizziness and establish cardiac care  Still with mild intermittent dizziness  /68, HR 56  EKG revealed atrial fibrillation 3/2023  daughter is asking about changing his coumadin  R/b/I/a of Eliquis d/w patient and family, agree to proceed  Stop coumadin  Start eliquis (first dose five days post stopping coumadin), D/W PATIENT AND HIS DAUGHTER   Will need to investigate for underlying structural heart disease, will proceed with a transthoracic echocardiogram   Hold norvasc  Cont metoprolol  Monitor home BP  Holter monitor   Stop ASA    Above findings and plan of care were discussed with patient in details, patient's questions were answered.      Disposition:  RTC in 12 MONTHS             Electronically signed by Dipti Hargrove MD, [facility], [address] [place] [address]    10/18/2023 at 10:54 AM EDT

## 2023-10-24 ENCOUNTER — HOSPITAL ENCOUNTER (OUTPATIENT)
Dept: NON INVASIVE DIAGNOSTICS | Age: 88
Discharge: HOME | End: 2023-10-24
Attending: INTERNAL MEDICINE
Payer: MEDICARE

## 2023-10-24 DIAGNOSIS — R42 DIZZINESS: ICD-10-CM

## 2023-10-24 DIAGNOSIS — I48.0 PAF (PAROXYSMAL ATRIAL FIBRILLATION) (HCC): ICD-10-CM

## 2023-10-24 PROCEDURE — 93306 TTE W/DOPPLER COMPLETE: CPT

## 2023-10-24 PROCEDURE — 93226 XTRNL ECG REC<48 HR SCAN A/R: CPT

## 2023-10-24 PROCEDURE — 93225 XTRNL ECG REC<48 HRS REC: CPT

## 2023-10-24 NOTE — PROCEDURES
48 hour Holter Monitor was applied to patient.  Patient was instructed to remove monitor on 10/26 at 1024 and return to  of hospital. The serial number on the Holter Monitor is [id number]

## 2023-10-30 ENCOUNTER — TELEPHONE (OUTPATIENT)
Dept: CARDIOLOGY CLINIC | Age: 88
End: 2023-10-30

## 2023-10-30 NOTE — TELEPHONE ENCOUNTER
No pauses long enough to warrant pacer most likely, as  not even 3 seconds, dizziness likely from BP but could be related to bigeminy episodes at times. Okay to wait until he is back.

## 2023-12-28 LAB — PROSTATE SPECIFIC ANTIGEN: 2.64 NG/ML

## 2024-01-04 ENCOUNTER — OFFICE VISIT (OUTPATIENT)
Dept: UROLOGY | Age: 89
End: 2024-01-04
Payer: MEDICARE

## 2024-01-04 VITALS
SYSTOLIC BLOOD PRESSURE: 132 MMHG | DIASTOLIC BLOOD PRESSURE: 74 MMHG | HEIGHT: 68 IN | BODY MASS INDEX: 33.19 KG/M2 | WEIGHT: 219 LBS

## 2024-01-04 DIAGNOSIS — M81.8 IDIOPATHIC OSTEOPOROSIS: ICD-10-CM

## 2024-01-04 DIAGNOSIS — C61 PROSTATE CANCER (HCC): Primary | ICD-10-CM

## 2024-01-04 DIAGNOSIS — M81.8 OTHER OSTEOPOROSIS WITHOUT CURRENT PATHOLOGICAL FRACTURE: ICD-10-CM

## 2024-01-04 PROCEDURE — 1036F TOBACCO NON-USER: CPT | Performed by: NURSE PRACTITIONER

## 2024-01-04 PROCEDURE — G8427 DOCREV CUR MEDS BY ELIG CLIN: HCPCS | Performed by: NURSE PRACTITIONER

## 2024-01-04 PROCEDURE — G8417 CALC BMI ABV UP PARAM F/U: HCPCS | Performed by: NURSE PRACTITIONER

## 2024-01-04 PROCEDURE — 96402 CHEMO HORMON ANTINEOPL SQ/IM: CPT | Performed by: NURSE PRACTITIONER

## 2024-01-04 PROCEDURE — G8484 FLU IMMUNIZE NO ADMIN: HCPCS | Performed by: NURSE PRACTITIONER

## 2024-01-04 PROCEDURE — 1124F ACP DISCUSS-NO DSCNMKR DOCD: CPT | Performed by: NURSE PRACTITIONER

## 2024-01-04 PROCEDURE — 96372 THER/PROPH/DIAG INJ SC/IM: CPT | Performed by: NURSE PRACTITIONER

## 2024-01-04 PROCEDURE — 99214 OFFICE O/P EST MOD 30 MIN: CPT | Performed by: NURSE PRACTITIONER

## 2024-01-04 ASSESSMENT — ENCOUNTER SYMPTOMS
ABDOMINAL PAIN: 0
NAUSEA: 0
BACK PAIN: 0
VOMITING: 0

## 2024-01-04 NOTE — PROGRESS NOTES
Patient has given me verbal consent to perform Lupron Injection  Yes      Following [facility] plan of care.  LUPRON 45 MG GIVEN I.M right UOQ HIP  Lot Number: [id number]   Expiration Date: 08/16/2025  NDC #: 4403-3409-76    After Injection was given there were no reactions at injection site and patient was feeling well. Patient was notified that possible side effects from injections include: Redness, swelling and itching at the injection site. Possible side effects of androgen deprivation therapy, including hot flashes, flushing of the skin, increased weight, decreased sex drive, and difficulties with ED. Patient was instructed to call the office with any further questions or concerns.     Patient supplied their own medications No      Pt LHRH therapy (Firmagon, Eligard, Lupron) first initiated on 4/11/19.     Patient has given me verbal consent to perform Prolia Injection  Yes    Following [facility] plan of care.  PROLIA 60MG GIVEN right S.C. in patient's arm  Lot Number: [id number]  Expiration Date: 08/28/2026  NDC #: 72766-493-57    After Injection was given there were no reactions at injection site and patient was feeling well. Patient was notified that possible side effects from injections include: Redness, swelling and itching at the injection site. Possible side effects of androgen deprivation therapy, including hot flashes, flushing of the skin, increased weight, decreased sex drive, and difficulties with ED. Patient was instructed to inform their dental office that they are receiving Prolia and that major dental procedures should be avoided. Patient was advised to call our office with any further questions or concerns.     Any active dental problems, infections, or pain?  No    Date of last dental appointment: N/A    Any planned dental procedures?  No    Patient supplied their own medications No      Pt LHRH therapy (Firmagon, Eligard, Lupron) first initiated on 4/11/19.   
tenderness.   Musculoskeletal:         General: Normal range of motion.   Skin:     General: Skin is warm and dry.   Neurological:      Mental Status: He is alert and oriented to person, place, and time. Mental status is at baseline.   Psychiatric:         Mood and Affect: Mood normal.         Behavior: Behavior normal.         Thought Content: Thought content normal.       POC  No results found for this visit on 01/04/24.    Patients recent PSA values are as follows  Lab Results   Component Value Date    PSA 2.64 12/28/2023    PSA 0.36 07/18/2023    PSA 0.76 04/10/2023        Recent BUN/Creatinine:  Lab Results   Component Value Date/Time    BUN 13 04/10/2023 08:08 AM    CREATININE 1.01 04/10/2023 08:08 AM       Assessment:   Prostate cancer     Plan:     PSA 2.64 12/28/23.  Pt on intermittent ADT with trigger of 2.  Lupron 45 mg and Prolia 60 mg today.  Continue calcium and vit D.  Repeat PSA in 6 and 12 months.  OV in 1 year.

## 2024-09-30 NOTE — PROGRESS NOTES
[facility] [place] [facility]  [facility] CARDIOLOGY  [address]  [address]  [place] [address]  Dept: [phone]  Dept Fax: [phone]  Loc: [phone]    Visit Date: 10/1/2024    Mr. Slatre is a 89 y.o. male  who presented for:  Atrial fibrillation  HPI:   HPI   Pete Slater is a pleasant 89 year old male patient who  has a past medical history of Atrial fibrillation (HCC), H/O Bell's palsy, Hyperlipidemia, and Hypertension. Patient has h/o atrial fibrillation, on OAC. Echocardiogram on 10/2023 revealed an EF of 55-60%, Mild LAE. The patient feels well. Compliant with his medications. Patient denies chest pain, shortness of breath, dyspnea on exertion. No palpitations.       Current Outpatient Medications:     apixaban (ELIQUIS) 5 MG TABS tablet, Take 1 tablet by mouth 2 times daily, Disp: 180 tablet, Rfl: 1    Cholecalciferol (VITAMIN D3) 50 MCG (2000 UT) TABS, Take by mouth, Disp: , Rfl:     calcium carbonate-vitamin D (CALTRATE) 600-400 MG-UNIT TABS per tab, Take 1 tablet by mouth 2 times daily, Disp: 60 tablet, Rfl: 5    Pantoprazole Sodium (PROTONIX PO), Take 40 mg by mouth daily, Disp: , Rfl:     gemfibrozil (LOPID) 600 MG tablet, Take 1 tablet by mouth 2 times daily, Disp: , Rfl:     metoprolol succinate (TOPROL XL) 50 MG extended release tablet, Take 1 tablet by mouth daily, Disp: , Rfl:     pravastatin (PRAVACHOL) 40 MG tablet, Take 1 tablet by mouth daily, Disp: , Rfl:     Omega-3 Fatty Acids (FISH OIL) 1200 MG CAPS, Take 1,200 mg by mouth daily, Disp: , Rfl:     Past Medical History  Pete WEAVER  has a past medical history of Atrial fibrillation (HCC), H/O Bell's palsy, Hyperlipidemia, and Hypertension.    Social History  Pete WEAVER  reports that he quit smoking about 30 years ago. His smoking use included cigarettes. He has never used smokeless tobacco. He reports current alcohol use. He reports that he does not use drugs.    Family History  Pete WEAVER family history includes

## 2024-10-01 ENCOUNTER — OFFICE VISIT (OUTPATIENT)
Dept: CARDIOLOGY CLINIC | Age: 89
End: 2024-10-01
Payer: MEDICARE

## 2024-10-01 VITALS
HEIGHT: 68 IN | BODY MASS INDEX: 31.92 KG/M2 | WEIGHT: 210.6 LBS | DIASTOLIC BLOOD PRESSURE: 65 MMHG | HEART RATE: 54 BPM | SYSTOLIC BLOOD PRESSURE: 127 MMHG

## 2024-10-01 DIAGNOSIS — I48.0 PAF (PAROXYSMAL ATRIAL FIBRILLATION) (HCC): Primary | ICD-10-CM

## 2024-10-01 PROCEDURE — G8417 CALC BMI ABV UP PARAM F/U: HCPCS | Performed by: INTERNAL MEDICINE

## 2024-10-01 PROCEDURE — 1124F ACP DISCUSS-NO DSCNMKR DOCD: CPT | Performed by: INTERNAL MEDICINE

## 2024-10-01 PROCEDURE — G8427 DOCREV CUR MEDS BY ELIG CLIN: HCPCS | Performed by: INTERNAL MEDICINE

## 2024-10-01 PROCEDURE — G8484 FLU IMMUNIZE NO ADMIN: HCPCS | Performed by: INTERNAL MEDICINE

## 2024-10-01 PROCEDURE — 93000 ELECTROCARDIOGRAM COMPLETE: CPT | Performed by: INTERNAL MEDICINE

## 2024-10-01 PROCEDURE — 1036F TOBACCO NON-USER: CPT | Performed by: INTERNAL MEDICINE

## 2024-10-01 PROCEDURE — 99214 OFFICE O/P EST MOD 30 MIN: CPT | Performed by: INTERNAL MEDICINE

## 2024-10-01 NOTE — PROGRESS NOTES
1 year follow up.    EKG done today.    Denies chest pain, palpitations, dizziness, shortness of breath, and edema.     No cardiac concerns at this time.

## 2024-12-26 LAB — PROSTATE SPECIFIC ANTIGEN: 2.49 NG/ML

## 2025-01-02 ENCOUNTER — OFFICE VISIT (OUTPATIENT)
Dept: UROLOGY | Age: 89
End: 2025-01-02
Payer: MEDICARE

## 2025-01-02 VITALS
DIASTOLIC BLOOD PRESSURE: 68 MMHG | SYSTOLIC BLOOD PRESSURE: 118 MMHG | HEIGHT: 68 IN | WEIGHT: 216.3 LBS | BODY MASS INDEX: 32.78 KG/M2

## 2025-01-02 DIAGNOSIS — C61 PROSTATE CANCER (HCC): Primary | ICD-10-CM

## 2025-01-02 DIAGNOSIS — M81.8 IDIOPATHIC OSTEOPOROSIS: ICD-10-CM

## 2025-01-02 DIAGNOSIS — E55.9 VITAMIN D DEFICIENCY: ICD-10-CM

## 2025-01-02 PROCEDURE — 99204 OFFICE O/P NEW MOD 45 MIN: CPT | Performed by: NURSE PRACTITIONER

## 2025-01-02 PROCEDURE — 1124F ACP DISCUSS-NO DSCNMKR DOCD: CPT | Performed by: NURSE PRACTITIONER

## 2025-01-02 PROCEDURE — 1036F TOBACCO NON-USER: CPT | Performed by: NURSE PRACTITIONER

## 2025-01-02 PROCEDURE — 96372 THER/PROPH/DIAG INJ SC/IM: CPT | Performed by: NURSE PRACTITIONER

## 2025-01-02 PROCEDURE — G8427 DOCREV CUR MEDS BY ELIG CLIN: HCPCS | Performed by: NURSE PRACTITIONER

## 2025-01-02 PROCEDURE — G8417 CALC BMI ABV UP PARAM F/U: HCPCS | Performed by: NURSE PRACTITIONER

## 2025-01-02 PROCEDURE — 1159F MED LIST DOCD IN RCRD: CPT | Performed by: NURSE PRACTITIONER

## 2025-01-02 ASSESSMENT — ENCOUNTER SYMPTOMS
BACK PAIN: 0
ABDOMINAL PAIN: 0
VOMITING: 0
NAUSEA: 0

## 2025-01-02 NOTE — PROGRESS NOTES
[facility] [place] [facility]  [facility]  [address] [place]  [facility] [address]  Dept: [phone]  Loc: [phone]    Visit Date: 1/2/2025        HPI:     Pete Slater is a 89 y.o. male who presents today for:  Chief Complaint   Patient presents with    Prostate Cancer     PSA prior       HPI    Pt seen in follow up for prostate cancer.      Pt underwent Uronav biopsy 2/18/19 for PSA of 12.21 with findings of [place] 4+3=7 and 3+4=7 prostate cancer in 13/15 cores.  Bone scan and CT 3/2019 were negative for metastatic disease.  Pt was started on hormonal therapy secondary to his age and health status.  He started Firmagon on 4/11/19 and was switched to Lupron 6/13/19.  (last dose of Lupron and Prolia 1/18/23).       He prefers intermittent therapy to continuous.  Pt takes Lupron and Prolia for trigger of 2.     Pete denies any hematuria, dysuria, increased abdominal or back pain.  Reports urinating well.  No dental pain.  No upcoming dental procedures.      Current Outpatient Medications   Medication Sig Dispense Refill    apixaban (ELIQUIS) 5 MG TABS tablet Take 1 tablet by mouth 2 times daily 180 tablet 1    Cholecalciferol (VITAMIN D3) 50 MCG (2000 UT) TABS Take by mouth      calcium carbonate-vitamin D (CALTRATE) 600-400 MG-UNIT TABS per tab Take 1 tablet by mouth 2 times daily 60 tablet 5    Pantoprazole Sodium (PROTONIX PO) Take 40 mg by mouth daily      gemfibrozil (LOPID) 600 MG tablet Take 1 tablet by mouth 2 times daily      metoprolol succinate (TOPROL XL) 50 MG extended release tablet Take 1 tablet by mouth daily      pravastatin (PRAVACHOL) 40 MG tablet Take 1 tablet by mouth daily      Omega-3 Fatty Acids (FISH OIL) 1200 MG CAPS Take 1,200 mg by mouth daily       No current facility-administered medications for this visit.       Past Medical History  Pete WEAVER  has a past medical history of Atrial fibrillation (HCC), H/O Bell's palsy, Hyperlipidemia, and

## 2025-01-02 NOTE — PROGRESS NOTES
Patient has given me verbal consent to perform Lupron Injection  Yes    DIAGNOSIS/INDICATION:  Prostate cancer    [facility]  LUPRON 45 MG GIVEN I.M left UOQ HIP  Lot Number: [id number]  Expiration Date: 09/2026  NDC #: 9090-5895-41    After Injection was given there were no reactions at injection site and patient was feeling well. Patient was notified that possible side effects from injections include: Redness, swelling and itching at the injection site. Possible side effects of androgen deprivation therapy, including hot flashes, flushing of the skin, increased weight, decreased sex drive, and difficulties with ED. Patient was instructed to call the office with any further questions or concerns.     Patient supplied their own medications No      Pt LHRH therapy (Firmagon, Eligard, Lupron, Orgovyx) first initiated on 04/11/2019.     Patient has given me verbal consent to perform Prolia Injection  Yes    DIAGNOSIS/INDICATION:  Prostate cancer    Patient's last Calcium level was 9.50 on the date of 03/26/2024.    [facility]  PROLIA 60MG GIVEN left S.C. in patient's arm  Lot Number: [id number]  Expiration Date: 02/28/2027  NDC #: 32507-374-16    After Injection was given there were no reactions at injection site and patient was feeling well. Patient was notified that possible side effects from injections include: Redness, swelling and itching at the injection site. Possible side effects of androgen deprivation therapy, including hot flashes, flushing of the skin, increased weight, decreased sex drive, and difficulties with ED. Patient was instructed to inform their dental office that they are receiving Prolia and that major dental procedures should be avoided. Patient was advised to call our office with any further questions or concerns.     Any active dental problems, infections, or pain?  No    Date of last dental appointment: N/A    Any

## 2025-01-03 PROCEDURE — 96402 CHEMO HORMON ANTINEOPL SQ/IM: CPT | Performed by: NURSE PRACTITIONER

## 2025-03-27 ENCOUNTER — TELEPHONE (OUTPATIENT)
Dept: CARDIOLOGY CLINIC | Age: 89
End: 2025-03-27

## 2025-03-27 NOTE — TELEPHONE ENCOUNTER
Advise patient and family that Eliquis is safer when compared to Coumadin with less risk for drug to drug interaction, less food restrictions and without need for blood tests that are required with coumadin    Inquire why they want to switch    If they still want to make the change, then ok to proceed and refer to coumadin clinic

## 2025-03-27 NOTE — TELEPHONE ENCOUNTER
Amber from PCP office called stating patient would like to switch Eliquis to Coumadin?  Ok to refer to [facility] coumadin clinic?  Per Amber our office needs to call patient's daughter Mounika at [phone] with Dr. Collazo's answer.

## 2025-03-27 NOTE — TELEPHONE ENCOUNTER
Called and talked to Wanda Dillon  Per daughter patient has bruising everywhere and is not affordable  Wanda will call her insurance to asked if there is a preferred drug more affordable   Reba will call pcp and ask if they will manage INR/dosing in [place] if switched to coumadin  Will call the office back with updates

## 2025-07-02 LAB — PROSTATE SPECIFIC ANTIGEN: 0.93 NG/ML

## 2025-07-03 ENCOUNTER — RESULTS FOLLOW-UP (OUTPATIENT)
Dept: UROLOGY | Age: 89
End: 2025-07-03